# Patient Record
Sex: MALE | Race: BLACK OR AFRICAN AMERICAN | Employment: OTHER | URBAN - METROPOLITAN AREA
[De-identification: names, ages, dates, MRNs, and addresses within clinical notes are randomized per-mention and may not be internally consistent; named-entity substitution may affect disease eponyms.]

---

## 2019-03-07 ENCOUNTER — HOSPITAL ENCOUNTER (OUTPATIENT)
Dept: NON INVASIVE DIAGNOSTICS | Age: 69
Discharge: HOME | End: 2019-03-07
Attending: INTERNAL MEDICINE
Payer: MEDICARE

## 2019-03-07 VITALS
HEIGHT: 69 IN | WEIGHT: 315 LBS | DIASTOLIC BLOOD PRESSURE: 78 MMHG | BODY MASS INDEX: 46.65 KG/M2 | SYSTOLIC BLOOD PRESSURE: 157 MMHG

## 2019-03-07 DIAGNOSIS — R06.02 SOB (SHORTNESS OF BREATH): ICD-10-CM

## 2019-03-07 LAB
ECHO AO ASC DIAM: 3.93 CM
ECHO AO ROOT DIAM: 4.4 CM
ECHO EST RA PRESSURE: 3 MMHG
ECHO IVC SNIFF: 2.31 CM
ECHO LA AREA 4C: 20.7 CM2
ECHO LA MAJOR AXIS: 3.77 CM
ECHO LA TO AORTIC ROOT RATIO: 0.86
ECHO LA VOL 2C: 53.5 ML (ref 18–58)
ECHO LA VOL 4C: 69.3 ML (ref 18–58)
ECHO LA VOL BP: 62.2 ML (ref 18–58)
ECHO LA VOL/BSA BIPLANE: 24.53 ML/M2 (ref 16–28)
ECHO LA VOLUME INDEX A2C: 21.1 ML/M2 (ref 16–28)
ECHO LA VOLUME INDEX A4C: 27.33 ML/M2 (ref 16–28)
ECHO LV E' LATERAL VELOCITY: 8.67 CENTIMETER/SECOND
ECHO LV E' SEPTAL VELOCITY: 10.24 CENTIMETER/SECOND
ECHO LV INTERNAL DIMENSION DIASTOLIC: 5.74 CM (ref 4.2–5.9)
ECHO LV INTERNAL DIMENSION SYSTOLIC: 3.57 CM
ECHO LV IVSD: 1.42 CM (ref 0.6–1)
ECHO LV MASS 2D: 391.2 G (ref 88–224)
ECHO LV MASS INDEX 2D: 154.3 G/M2 (ref 49–115)
ECHO LV POSTERIOR WALL DIASTOLIC: 1.17 CM (ref 0.6–1)
ECHO LVOT DIAM: 2.52 CM
ECHO MV A VELOCITY: 87.81 CM/S
ECHO MV AREA PHT: 2.7 CM2
ECHO MV E DECELERATION TIME (DT): 282.9 MS
ECHO MV E VELOCITY: 69.91 CM/S
ECHO MV E/A RATIO: 0.8
ECHO MV PRESSURE HALF TIME (PHT): 82 MS
ECHO PULMONARY ARTERY SYSTOLIC PRESSURE (PASP): 17.6 MMHG
ECHO PV MAX VELOCITY: 104.9 CM/S
ECHO PV PEAK GRADIENT: 4.4 MMHG
ECHO RIGHT VENTRICULAR SYSTOLIC PRESSURE (RVSP): 17.6 MMHG
ECHO RV INTERNAL DIMENSION: 3.8 CM
ECHO RV TAPSE: 1.91 CM (ref 1.5–2)
ECHO TV REGURGITANT MAX VELOCITY: 190.99 CM/S
ECHO TV REGURGITANT PEAK GRADIENT: 14.6 MMHG

## 2019-03-07 PROCEDURE — 93306 TTE W/DOPPLER COMPLETE: CPT

## 2019-05-13 ENCOUNTER — APPOINTMENT (OUTPATIENT)
Dept: GENERAL RADIOLOGY | Age: 69
End: 2019-05-13
Attending: STUDENT IN AN ORGANIZED HEALTH CARE EDUCATION/TRAINING PROGRAM
Payer: MEDICARE

## 2019-05-13 ENCOUNTER — HOSPITAL ENCOUNTER (EMERGENCY)
Age: 69
Discharge: HOME | End: 2019-05-13
Attending: STUDENT IN AN ORGANIZED HEALTH CARE EDUCATION/TRAINING PROGRAM
Payer: MEDICARE

## 2019-05-13 VITALS
TEMPERATURE: 99.1 F | HEART RATE: 81 BPM | BODY MASS INDEX: 45.1 KG/M2 | HEIGHT: 70 IN | DIASTOLIC BLOOD PRESSURE: 95 MMHG | WEIGHT: 315 LBS | OXYGEN SATURATION: 94 % | SYSTOLIC BLOOD PRESSURE: 206 MMHG | RESPIRATION RATE: 20 BRPM

## 2019-05-13 DIAGNOSIS — R06.02 SOB (SHORTNESS OF BREATH): Primary | ICD-10-CM

## 2019-05-13 LAB
ALBUMIN SERPL-MCNC: 3.5 G/DL (ref 3.5–5)
ALBUMIN/GLOB SERPL: 0.8 {RATIO} (ref 1.1–2.2)
ALP SERPL-CCNC: 108 U/L (ref 45–117)
ALT SERPL-CCNC: 20 U/L (ref 12–78)
ANION GAP SERPL CALC-SCNC: 3 MMOL/L (ref 5–15)
AST SERPL-CCNC: 11 U/L (ref 15–37)
BASOPHILS # BLD: 0.1 K/UL (ref 0–0.1)
BASOPHILS NFR BLD: 1 % (ref 0–1)
BILIRUB SERPL-MCNC: 0.4 MG/DL (ref 0.2–1)
BNP SERPL-MCNC: 167 PG/ML
BUN SERPL-MCNC: 16 MG/DL (ref 6–20)
BUN/CREAT SERPL: 14 (ref 12–20)
CALCIUM SERPL-MCNC: 8.7 MG/DL (ref 8.5–10.1)
CHLORIDE SERPL-SCNC: 109 MMOL/L (ref 97–108)
CO2 SERPL-SCNC: 29 MMOL/L (ref 21–32)
COMMENT, HOLDF: NORMAL
COMMENT, HOLDF: NORMAL
CREAT SERPL-MCNC: 1.11 MG/DL (ref 0.7–1.3)
DIFFERENTIAL METHOD BLD: ABNORMAL
EOSINOPHIL # BLD: 0.3 K/UL (ref 0–0.4)
EOSINOPHIL NFR BLD: 3 % (ref 0–7)
ERYTHROCYTE [DISTWIDTH] IN BLOOD BY AUTOMATED COUNT: 16.9 % (ref 11.5–14.5)
GLOBULIN SER CALC-MCNC: 4.3 G/DL (ref 2–4)
GLUCOSE SERPL-MCNC: 179 MG/DL (ref 65–100)
HCT VFR BLD AUTO: 44.5 % (ref 36.6–50.3)
HGB BLD-MCNC: 14 G/DL (ref 12.1–17)
IMM GRANULOCYTES # BLD AUTO: 0 K/UL (ref 0–0.04)
IMM GRANULOCYTES NFR BLD AUTO: 0 % (ref 0–0.5)
LYMPHOCYTES # BLD: 2.2 K/UL (ref 0.8–3.5)
LYMPHOCYTES NFR BLD: 23 % (ref 12–49)
MCH RBC QN AUTO: 26.8 PG (ref 26–34)
MCHC RBC AUTO-ENTMCNC: 31.5 G/DL (ref 30–36.5)
MCV RBC AUTO: 85.2 FL (ref 80–99)
MONOCYTES # BLD: 0.7 K/UL (ref 0–1)
MONOCYTES NFR BLD: 8 % (ref 5–13)
NEUTS SEG # BLD: 6.4 K/UL (ref 1.8–8)
NEUTS SEG NFR BLD: 65 % (ref 32–75)
NRBC # BLD: 0 K/UL (ref 0–0.01)
NRBC BLD-RTO: 0 PER 100 WBC
PLATELET # BLD AUTO: 254 K/UL (ref 150–400)
PMV BLD AUTO: 10.1 FL (ref 8.9–12.9)
POTASSIUM SERPL-SCNC: 3.5 MMOL/L (ref 3.5–5.1)
PROT SERPL-MCNC: 7.8 G/DL (ref 6.4–8.2)
RBC # BLD AUTO: 5.22 M/UL (ref 4.1–5.7)
SAMPLES BEING HELD,HOLD: NORMAL
SAMPLES BEING HELD,HOLD: NORMAL
SODIUM SERPL-SCNC: 141 MMOL/L (ref 136–145)
TROPONIN I SERPL-MCNC: <0.05 NG/ML
WBC # BLD AUTO: 9.7 K/UL (ref 4.1–11.1)

## 2019-05-13 PROCEDURE — 83880 ASSAY OF NATRIURETIC PEPTIDE: CPT

## 2019-05-13 PROCEDURE — 85025 COMPLETE CBC W/AUTO DIFF WBC: CPT

## 2019-05-13 PROCEDURE — 80053 COMPREHEN METABOLIC PANEL: CPT

## 2019-05-13 PROCEDURE — 84484 ASSAY OF TROPONIN QUANT: CPT

## 2019-05-13 PROCEDURE — 93005 ELECTROCARDIOGRAM TRACING: CPT

## 2019-05-13 PROCEDURE — 36415 COLL VENOUS BLD VENIPUNCTURE: CPT

## 2019-05-13 PROCEDURE — 71046 X-RAY EXAM CHEST 2 VIEWS: CPT

## 2019-05-13 PROCEDURE — 99283 EMERGENCY DEPT VISIT LOW MDM: CPT

## 2019-05-13 NOTE — ED PROVIDER NOTES
Gordon Singh is a 78 y.o. male who presents to the ED with a c/o productive cough, Headache, Rt ear pain, shortness of breath onset 1 week ago. Pt currently rates his pain at 7/10 in severity. Pt denies any chest pain at this time. Pt reports chronic respiratory issues but states worsening sx over the last week. Of note, pt notes that he traveled to [facility] from April 15-20th. He is also followed by Dr. Regina Wakefield for Pulmonology. Pt specifically denies  shortness of breath, n/v,d , fever, chills, numbness, tingling, abdominal pain, back pain,  leg swelling, dizziness or any other acute sx. PCP: Rosemary Caceres MD 
PMHx significant for: HTN 
PSHx significant for: none Social Hx: Tobacco: none EtOH: none Illicit drug use: none There are no further complaints or symptoms at this time. Signed by: phi Espinal for [facility] Jeni. El Wilhelm MD on May 13th, 2019 at 7:00 PM. The history is provided by the patient. No  was used. No past medical history on file. No past surgical history on file. No family history on file. Social History Socioeconomic History  Marital status:  Spouse name: Not on file  Number of children: Not on file  Years of education: Not on file  Highest education level: Not on file Occupational History  Not on file Social Needs  Financial resource strain: Not on file  Food insecurity:  
  Worry: Not on file Inability: Not on file  Transportation needs:  
  Medical: Not on file Non-medical: Not on file Tobacco Use  Smoking status: Not on file Substance and Sexual Activity  Alcohol use: Not on file  Drug use: Not on file  Sexual activity: Not on file Lifestyle  Physical activity:  
  Days per week: Not on file Minutes per session: Not on file  Stress: Not on file Relationships  Social connections:  
  Talks on phone: Not on file Gets together: Not on file Attends Jain service: Not on file Active member of club or organization: Not on file Attends meetings of clubs or organizations: Not on file Relationship status: Not on file  Intimate partner violence:  
  Fear of current or ex partner: Not on file Emotionally abused: Not on file Physically abused: Not on file Forced sexual activity: Not on file Other Topics Concern  Not on file Social History Narrative  Not on file ALLERGIES: Patient has no allergy information on record. Review of Systems Constitutional: Negative for chills and fever. HENT: Positive for ear pain (rt). Respiratory: Positive for cough and shortness of breath. Cardiovascular: Negative for chest pain. Gastrointestinal: Negative for abdominal pain, diarrhea, nausea and vomiting. Musculoskeletal: Negative for back pain and neck pain. Neurological: Positive for headaches. Negative for weakness. All other systems reviewed and are negative. There were no vitals filed for this visit. Physical Exam  
Constitutional: He is oriented to person, place, and time. He appears well-nourished. No distress. morbidly obese HENT:  
Head: Normocephalic and atraumatic. Nose: Nose normal.  
Mouth/Throat: Oropharynx is clear and moist. No oropharyngeal exudate. Eyes: Conjunctivae and EOM are normal. Right eye exhibits no discharge. Left eye exhibits no discharge. No scleral icterus. Neck: Normal range of motion. Neck supple. No JVD present. No tracheal deviation present. No thyromegaly present. Cardiovascular: Normal rate, regular rhythm, normal heart sounds and intact distal pulses. Exam reveals no gallop and no friction rub. No murmur heard. Pulmonary/Chest: Effort normal and breath sounds normal. No stridor. No respiratory distress. He has no wheezes. He has no rales. He exhibits no tenderness.   
Dyspneic at rest  
 Abdominal: Bowel sounds are normal. He exhibits no distension and no mass. There is no tenderness. There is no rebound. Musculoskeletal: Normal range of motion. He exhibits no edema or tenderness. Lymphadenopathy:  
  He has no cervical adenopathy. Neurological: He is alert and oriented to person, place, and time. No cranial nerve deficit. Coordination normal.  
Skin: Skin is warm and dry. No rash noted. He is not diaphoretic. No erythema. No pallor. Psychiatric: He has a normal mood and affect. His behavior is normal. Judgment and thought content normal.  
Nursing note and vitals reviewed. MDM Number of Diagnoses or Management Options SOB (shortness of breath):  
  
Amount and/or Complexity of Data Reviewed Clinical lab tests: ordered and reviewed Tests in the radiology section of CPT®: reviewed and ordered Independent visualization of images, tracings, or specimens: yes Risk of Complications, Morbidity, and/or Mortality Presenting problems: moderate Diagnostic procedures: moderate Management options: moderate Patient Progress Patient progress: stable Procedures

## 2019-05-14 LAB
ATRIAL RATE: 74 BPM
CALCULATED P AXIS, ECG09: 45 DEGREES
CALCULATED R AXIS, ECG10: -36 DEGREES
CALCULATED T AXIS, ECG11: 6 DEGREES
DIAGNOSIS, 93000: NORMAL
P-R INTERVAL, ECG05: 216 MS
Q-T INTERVAL, ECG07: 420 MS
QRS DURATION, ECG06: 168 MS
QTC CALCULATION (BEZET), ECG08: 466 MS
VENTRICULAR RATE, ECG03: 74 BPM

## 2019-05-14 NOTE — DISCHARGE INSTRUCTIONS

## 2019-05-31 ENCOUNTER — TELEPHONE (OUTPATIENT)
Dept: INTERNAL MEDICINE | Age: 69
End: 2019-05-31

## 2019-07-22 ENCOUNTER — OFFICE VISIT (OUTPATIENT)
Dept: CARDIOLOGY CLINIC | Age: 69
End: 2019-07-22

## 2019-07-22 VITALS
WEIGHT: 315 LBS | BODY MASS INDEX: 45.1 KG/M2 | RESPIRATION RATE: 20 BRPM | HEART RATE: 77 BPM | OXYGEN SATURATION: 96 % | DIASTOLIC BLOOD PRESSURE: 80 MMHG | HEIGHT: 70 IN | SYSTOLIC BLOOD PRESSURE: 142 MMHG

## 2019-07-22 DIAGNOSIS — E11.9 TYPE 2 DIABETES MELLITUS WITHOUT COMPLICATION, WITH LONG-TERM CURRENT USE OF INSULIN (HCC): ICD-10-CM

## 2019-07-22 DIAGNOSIS — G47.33 OSA TREATED WITH BIPAP: ICD-10-CM

## 2019-07-22 DIAGNOSIS — Z79.4 TYPE 2 DIABETES MELLITUS WITHOUT COMPLICATION, WITH LONG-TERM CURRENT USE OF INSULIN (HCC): ICD-10-CM

## 2019-07-22 DIAGNOSIS — E66.01 MORBID (SEVERE) OBESITY DUE TO EXCESS CALORIES (HCC): ICD-10-CM

## 2019-07-22 DIAGNOSIS — I10 ESSENTIAL HYPERTENSION: ICD-10-CM

## 2019-07-22 DIAGNOSIS — R06.09 DOE (DYSPNEA ON EXERTION): Primary | ICD-10-CM

## 2019-07-22 DIAGNOSIS — I45.2 RBBB (RIGHT BUNDLE BRANCH BLOCK WITH LEFT ANTERIOR FASCICULAR BLOCK): ICD-10-CM

## 2019-07-22 DIAGNOSIS — J98.09 BRONCHOSPASTIC AIRWAY DISEASE: ICD-10-CM

## 2019-07-22 RX ORDER — LORATADINE 10 MG/1
10 TABLET ORAL
COMMUNITY

## 2019-07-22 RX ORDER — ACETAMINOPHEN 500 MG
TABLET ORAL
COMMUNITY

## 2019-07-22 RX ORDER — TRAMADOL HYDROCHLORIDE 50 MG/1
50 TABLET ORAL
COMMUNITY
End: 2020-02-17

## 2019-07-22 RX ORDER — MAGNESIUM SULFATE 100 %
15 CRYSTALS MISCELLANEOUS AS NEEDED
COMMUNITY

## 2019-07-22 RX ORDER — ASPIRIN 81 MG/1
TABLET ORAL DAILY
COMMUNITY

## 2019-07-22 RX ORDER — GABAPENTIN 100 MG/1
CAPSULE ORAL 2 TIMES DAILY
COMMUNITY

## 2019-07-22 RX ORDER — ALBUTEROL SULFATE 90 UG/1
AEROSOL, METERED RESPIRATORY (INHALATION)
COMMUNITY

## 2019-07-22 RX ORDER — SERTRALINE HYDROCHLORIDE 100 MG/1
TABLET, FILM COATED ORAL DAILY
COMMUNITY

## 2019-07-22 RX ORDER — LATANOPROST 50 UG/ML
1 SOLUTION/ DROPS OPHTHALMIC
COMMUNITY

## 2019-07-22 RX ORDER — GLUCOSAMINE SULFATE 1500 MG
POWDER IN PACKET (EA) ORAL DAILY
COMMUNITY

## 2019-07-22 RX ORDER — UMECLIDINIUM BROMIDE AND VILANTEROL TRIFENATATE 62.5; 25 UG/1; UG/1
POWDER RESPIRATORY (INHALATION)
Refills: 6 | COMMUNITY
Start: 2019-06-23

## 2019-07-22 RX ORDER — TAMSULOSIN HYDROCHLORIDE 0.4 MG/1
CAPSULE ORAL
Refills: 99 | COMMUNITY
Start: 2019-06-09

## 2019-07-22 RX ORDER — LOSARTAN POTASSIUM 100 MG/1
100 TABLET ORAL DAILY
COMMUNITY

## 2019-07-22 RX ORDER — ROSUVASTATIN CALCIUM 40 MG/1
40 TABLET, COATED ORAL
COMMUNITY

## 2019-07-22 RX ORDER — DILTIAZEM HYDROCHLORIDE 360 MG/1
360 CAPSULE, EXTENDED RELEASE ORAL DAILY
COMMUNITY

## 2019-07-22 RX ORDER — BISMUTH SUBSALICYLATE 262 MG
1 TABLET,CHEWABLE ORAL DAILY
COMMUNITY
End: 2020-02-17 | Stop reason: SDUPTHER

## 2019-07-22 RX ORDER — ALBUTEROL SULFATE 0.83 MG/ML
SOLUTION RESPIRATORY (INHALATION)
COMMUNITY

## 2019-07-22 RX ORDER — GUAIFENESIN 100 MG/5ML
200 SOLUTION ORAL
COMMUNITY
End: 2020-02-17

## 2019-07-22 RX ORDER — OXYBUTYNIN CHLORIDE 10 MG/1
TABLET, EXTENDED RELEASE ORAL
Refills: 99 | COMMUNITY
Start: 2019-06-09

## 2019-07-22 RX ORDER — LANOLIN ALCOHOL/MO/W.PET/CERES
CREAM (GRAM) TOPICAL
COMMUNITY

## 2019-07-22 NOTE — LETTER
7/23/19 Patient: Almita Tripp YOB: 1950 Date of Visit: 7/22/2019 Jossie Franklin MD 
[address] [address] VIA In Basket Dear Jossie Franklin MD, Thank you for referring Mr. Jason Teixeira to [address]  for evaluation. My notes for this consultation are attached. If you have questions, please do not hesitate to call me. I look forward to following your patient along with you. Sincerely, Mello Davies MD

## 2019-07-22 NOTE — PATIENT INSTRUCTIONS
-I highly recommend [facility] for weight loss.  -Check out Aliya Us with [employer]

## 2019-07-22 NOTE — PROGRESS NOTES
[address], [address]  [address]  [place], [address] [facility]    Office [phone]  Fax [phone]  Cell [phone]       Remigio Looney is a 78 y.o. male referred for evaluation of SOB. Consult requested by Corrina Schmitt MD      Diagnoses  Encounter Diagnoses     ICD-10-CM ICD-9-CM   1. HUITRON (dyspnea on exertion) R06.09 786.09   2. Bronchospastic airway disease J98.09 519.19   3. Type 2 diabetes mellitus without complication, with long-term current use of insulin (HCC) E11.9 250.00    Z79.4 V58.67   4. Essential hypertension I10 401.9   5. Morbid (severe) obesity due to excess calories (HCC) E66.01 278.01   6. JASWINDER treated with BiPAP G47.33 327.23   7. RBBB (right bundle branch block with left anterior fascicular block) I45.2 426.52       Assessment/Recommendations:    Chronic Class 3 HUITRON - multifactorial airway disease, obesity, sleep apnea. He may have HFpEF. Recent proBNP from the  (minimally elevated but could be falsely low due to morbid obesity) He has chronic edema with 2-3 pillow orthopnea. He is not on diuretic therapy at this time. Recent echo with mild LV dysfunction - EF 45-50%. Will evaluate for myocardial ischemia with lexiscan Cardiolite. If this is normal, consider maintenance diuretic therapy with Bumex 1mg/d. Morbid obesity, complicated by T1YK, HTN, JASWINDER. Will refer him for medical weight loss with Dr. Madelyn Stockton    Phone follow up after reviewing tests      Subjective:    No prior cardiac hx. He has HTN, DM, morbid obesity. Pt has chronic HUITRON with small airway disease based on PFTs, followed by Corrina Schmitt MD. Recent echo demonstrated mild EF dysfunction 45-50%. Adenosine nuclear 2010 apical ischemia EF 51%. He has hx of cath at [facility] years ago, apparently normal.     Remigio Looney reports he gets winded very easily with minimal exertion, but no tightness in his chest. He has a mild cough productive of clear phlegm.  He wears continuous O2. He presented to the ED in 5/2019 for SOB. CXR with atx, low grade proBNP elevation. EKG with RBBB    He leads a sedentary lifestyle. He is retired from [employer] and he gets a lot of his care at the [place]. He is also a former . He has JASWINDER on BiPAP. He ambulates with a cane. Patient denies any exertional chest pain, palpitations, syncope,  or paroxysmal nocturnal dyspnea. He is here today with his wife. Cardiac risk factors   HTN yes  DM  yes  Smoking no, former pipe smoker  Family hx of CAD no      Cardiac testing  Echo 3/7/19 - EF 45-50%, mild LVH    Patient Active Problem List    Diagnosis    RBBB (right bundle branch block with left anterior fascicular block)    JASWINDER treated with BiPAP    Essential hypertension    Type 2 diabetes mellitus without complication, with long-term current use of insulin ([facility])    Bronchospastic airway disease    Morbid (severe) obesity due to excess calories ([address]       History reviewed. No pertinent past medical history. Social History     Socioeconomic History    Marital status:      Spouse name: Not on file    Number of children: Not on file    Years of education: Not on file    Highest education level: Not on file   Tobacco Use    Smoking status: Former Smoker    Smokeless tobacco: Never Used   Substance and Sexual Activity    Alcohol use: Yes     Comment: occasionally           No Known Allergies       Review of Symptoms:  Constitutional: Negative for fever, chills, malaise/fatigue and diaphoresis. Respiratory: Negative for hemoptysis, sputum production, and wheezing. +HUITRON, cough, pillow orthopnea  Cardiovascular: Negative for chest pain, palpitations, orthopnea, claudication, leg swelling and PND. Gastrointestinal: Negative for heartburn, nausea, vomiting, blood in stool and melena. Genitourinary: Negative for dysuria and flank pain. Musculoskeletal: Negative for joint pain and back pain.   Skin: Negative for rash.  Neurological: Negative for focal weakness, seizures, loss of consciousness, weakness and headaches. Endo/Heme/Allergies: Does not bruise/bleed easily. Psychiatric/Behavioral: Negative for memory loss. The patient does not have insomnia. Physical Exam  Visit Vitals  /80 (BP 1 Location: Left arm, BP Patient Position: Sitting)   Pulse 77   Resp 20   Ht 5' 10\" (1.778 m)   Wt 332 lb (150.6 kg)   SpO2 96%   BMI 47.64 kg/m²     Wt Readings from Last 3 Encounters:   07/22/19 332 lb (150.6 kg)   05/13/19 334 lb (151.5 kg)   03/07/19 324 lb (147 kg)     General - morbidly obese BM  HEENT: Eyes without jaundice, Hearing intact  Neck - JVP difficult to assess due to thick neck, thyroid nl  Cardiac - normal S1,S2, no murmurs, rubs or gallops. No clicks  Vascular - carotids without bruits, radials, femorals and pedal pulses equal bilateral  Lungs - clear to auscultation bilaterals, no rales ,wheezing or rhonchi  Abd - soft nontender, no HSM, no abd bruits  Extremities - 2+ pretibial edema bilaterally  Neuro - nonfocal, normal gait  Psych - mood and affect normal    Labs:      Cardiographics  Echo 3/7/19 - EF 45-50%, mild LVH  EKG 5/13/19 - SR, RBBB, LAHB, no significant change from 7/16/10          Written by Krysta Palafox, as dictated by Alva Escobedo M.D.      Alva Escobedo MD

## 2019-07-22 NOTE — PROGRESS NOTES
Remigio Looney is a 78 y.o. male    Chief Complaint   Patient presents with    New Patient    Shortness of Breath       Visit Vitals  /80 (BP 1 Location: Left arm, BP Patient Position: Sitting)   Pulse 77   Resp 20   Ht 5' 10\" (1.778 m)   Wt 332 lb (150.6 kg)   SpO2 96%   BMI 47.64 kg/m²       1. Have you been to the ER, urgent care clinic since your last visit? Hospitalized since your last visit? YES, 5/13/19 for SOB and cough. 2. Have you seen or consulted any other health care providers outside of the [address] [place] [address] since your last visit? Include any pap smears or colon screening.  NO

## 2019-07-23 PROBLEM — E11.9 TYPE 2 DIABETES MELLITUS WITHOUT COMPLICATION, WITH LONG-TERM CURRENT USE OF INSULIN (HCC): Status: ACTIVE | Noted: 2019-07-23

## 2019-07-23 PROBLEM — J98.09 BRONCHOSPASTIC AIRWAY DISEASE: Status: ACTIVE | Noted: 2019-07-23

## 2019-07-23 PROBLEM — G47.33 OSA TREATED WITH BIPAP: Status: ACTIVE | Noted: 2019-07-23

## 2019-07-23 PROBLEM — Z79.4 TYPE 2 DIABETES MELLITUS WITHOUT COMPLICATION, WITH LONG-TERM CURRENT USE OF INSULIN (HCC): Status: ACTIVE | Noted: 2019-07-23

## 2019-07-23 PROBLEM — I45.2 RBBB (RIGHT BUNDLE BRANCH BLOCK WITH LEFT ANTERIOR FASCICULAR BLOCK): Status: ACTIVE | Noted: 2019-07-23

## 2019-07-23 PROBLEM — I10 ESSENTIAL HYPERTENSION: Status: ACTIVE | Noted: 2019-07-23

## 2019-08-21 ENCOUNTER — OFFICE VISIT (OUTPATIENT)
Dept: FAMILY MEDICINE CLINIC | Age: 69
End: 2019-08-21

## 2019-08-21 VITALS
RESPIRATION RATE: 22 BRPM | DIASTOLIC BLOOD PRESSURE: 72 MMHG | SYSTOLIC BLOOD PRESSURE: 144 MMHG | HEART RATE: 75 BPM | WEIGHT: 315 LBS | HEIGHT: 70 IN | OXYGEN SATURATION: 90 % | BODY MASS INDEX: 45.1 KG/M2 | TEMPERATURE: 98 F

## 2019-08-21 DIAGNOSIS — I10 ESSENTIAL HYPERTENSION: ICD-10-CM

## 2019-08-21 DIAGNOSIS — J98.09 BRONCHOSPASTIC AIRWAY DISEASE: ICD-10-CM

## 2019-08-21 DIAGNOSIS — E11.9 TYPE 2 DIABETES MELLITUS WITHOUT COMPLICATION, WITH LONG-TERM CURRENT USE OF INSULIN (HCC): Primary | ICD-10-CM

## 2019-08-21 DIAGNOSIS — G47.33 OSA TREATED WITH BIPAP: ICD-10-CM

## 2019-08-21 DIAGNOSIS — Z79.4 TYPE 2 DIABETES MELLITUS WITHOUT COMPLICATION, WITH LONG-TERM CURRENT USE OF INSULIN (HCC): Primary | ICD-10-CM

## 2019-08-21 DIAGNOSIS — E66.01 MORBID (SEVERE) OBESITY DUE TO EXCESS CALORIES (HCC): ICD-10-CM

## 2019-08-21 LAB — HBA1C MFR BLD HPLC: 6.9 %

## 2019-08-21 NOTE — PROGRESS NOTES
1. Have you been to the ER, urgent care clinic since your last visit? Hospitalized since your last visit? No    2. Have you seen or consulted any other health care providers outside of the [address] [place] [address] since your last visit? Include any pap smears or colon screening.  [facility]    Chief Complaint   Patient presents with   [facility]

## 2019-08-21 NOTE — PROGRESS NOTES
Weight Loss Progress Note: Initial Physician Visit      Cam Moody is a 78 y.o. male with BMI   47.64 who is here for his Initial Evaluation for the medical bariatric care. CC: my cardiologist said I need to lose weight  He was referred by cardiology, Dr Miguel Rizo. He is here to       65 units in am and 50 in pm of nph insulin  He also takes victoza        Weight History  Current weight 332 and BMI is Body mass index is 47.64 kg/m². Goal weight BMI 30  Highest weight 332  (See weight gain time line scanned into media section of chart)    Weight loss History  How many weight loss attempts have you had? *none  Which program were you most successful doing?  na    Significant Medical History    Have you ever taken appetite suppressants? no   If yes: Rx or OTC? If yes; Any negative side effects? Ever diagnosed with sleep apnea or put on CPAP no    Ever had bariatric surgery: no    Pregnant or planning on becoming pregnant w/in 6 months: no        Significant Psychosocial History   Any history of drug abuse or dependence: no  Current Major Lifestyle Changes: no  Any potential unsupportive people: no  Why are you starting a weight loss program now? Heart doc said he needs to  Are you ready? yes    History of binge eating disorder or anorexia : no   If yes, are you currently being treated no    Social History  Social History     Tobacco Use    Smoking status: Former Smoker    Smokeless tobacco: Never Used   Substance Use Topics    Alcohol use: Yes     Comment: occasionally     How many times a week do you eat out? 6    Do you drink any EtOH?  no   If so, how much? Do you have upcoming any travel in the next 6 weeks?  no   If so, what do you have planned?           Exercise  How many days a week do you currently exercise?  0 days  Have you ever been told by a physician not to exercise?  no      Objective  Visit Vitals  /72   Pulse 75   Temp 98 °F (36.7 °C) (Oral)   Resp 22   Ht 5' 10\" (1.778 m)   Wt 332 lb (150.6 kg)   SpO2 90% Comment: pt is prescribe O2 at 2L and currently not in place   BMI 47.64 kg/m²       Waist Circumference: See Weight Management Doc Flowsheet  Neck Circumference: See Weight Management Doc Flowsheet  Percent Body Fat: See Weight Management Doc Flowsheet  Weight Metrics 8/21/2019 8/21/2019 7/22/2019 5/13/2019 3/7/2019   Weight - 332 lb 332 lb 334 lb 324 lb   Neck Circ (inches) 19 - - - -   Waist Measure Inches 58.5 - - - -   BMI - 47.64 kg/m2 47.64 kg/m2 47.92 kg/m2 47.85 kg/m2         Labs:     Review of Systems  Complete ROS negative except where noted above      Physical Exam    Vital Signs Reviewed  Weight Management Metrics Reviewed    Vital Signs Reviewed  Appearance: Obese, A&O x 3, NAD  HEENT:  NC/AT, EOMI, PERRL, No scleral icterus, malampatti score:  Skin:    Skin tags - no   Acanthosis Nigricans - no  Neck:  No nodes, thyromegaly no  Heart:  RRR without M/R/G  Lungs:  CTAB, no rhonchi, rales, or wheezes with good air exchange   Abdomen:  Non-tender, pos bowel sounds; hepatomegaly - no  Ext:  No C/C/E        Assessment & Plan  Diagnoses and all orders for this visit:    1. Type 2 diabetes mellitus without complication, with long-term current use of insulin (HCC)  -     AMB POC HEMOGLOBIN A1C    2. Morbid (severe) obesity due to excess calories ([address]  Diet:  He wants to attend orienttion for the [employer]    Activity start moving more each day    medication  3. Essential hypertension  bp borderline  4. JASWINDER treated with BiPAP  Not using the cpap  5. Bronchospastic airway disease        Based on his history and exam, Susy García is a good candidate for the Non-[facility] Weight Loss Program     Diet Plan: Activity Plan:    Medication Plan      Patient Instructions   1. [facility] changing the zoloft to wellbutrin        Follow-up and Dispositions    · Return for weight management [facility].          Over 50% of the 30 minutes face to face time with Celsa Davenport consisted of counseling & coordinating and/or discussing treatment plans in reference to his obesity The primary encounter diagnosis was Type 2 diabetes mellitus without complication, with long-term current use of insulin ([facility] Diagnoses of Morbid (severe) obesity due to excess calories ([address] Essential hypertension, JASWINDER treated with BiPAP, and Bronchospastic airway disease were also pertinent to this visit.

## 2019-09-09 ENCOUNTER — PATIENT MESSAGE (OUTPATIENT)
Dept: CARDIOLOGY CLINIC | Age: 69
End: 2019-09-09

## 2019-09-10 NOTE — TELEPHONE ENCOUNTER
Cardiac testing  Echo 3/7/19 - EF 45-50%, mild LVH    Lexiscan Cardiolite 9/6/19 - equivocal perfusion study. Fixed inferior defect likely due to attenuation artifact but infarction cannot be excluded. I have contacted Mr. Viviana Molina and discussed equivoal stress test results and recommendations for further evaluation with cardiac cathterization with Dr. Claudia Espinal. He is agreeable to proceed at this time. Lab Results   Component Value Date/Time    Sodium 141 05/13/2019 07:14 PM    Potassium 3.5 05/13/2019 07:14 PM    Chloride 109 (H) 05/13/2019 07:14 PM    CO2 29 05/13/2019 07:14 PM    Anion gap 3 (L) 05/13/2019 07:14 PM    Glucose 179 (H) 05/13/2019 07:14 PM    BUN 16 05/13/2019 07:14 PM    Creatinine 1.11 05/13/2019 07:14 PM    BUN/Creatinine ratio 14 05/13/2019 07:14 PM    GFR est AA >60 05/13/2019 07:14 PM    GFR est non-AA >60 05/13/2019 07:14 PM    Calcium 8.7 05/13/2019 07:14 PM     Lab Results   Component Value Date/Time    WBC 9.7 05/13/2019 07:14 PM    Hemoglobin (POC) 14.3 07/14/2010 01:32 PM    HGB 14.0 05/13/2019 07:14 PM    Hematocrit (POC) 42 07/14/2010 01:32 PM    HCT 44.5 05/13/2019 07:14 PM    PLATELET 340 55/48/3382 07:14 PM    MCV 85.2 05/13/2019 07:14 PM     Need to repeat BMP and CBC pre-procedure. Will forward on for scheduling.

## 2019-09-12 ENCOUNTER — TELEPHONE (OUTPATIENT)
Dept: CARDIOLOGY CLINIC | Age: 69
End: 2019-09-12

## 2019-09-12 DIAGNOSIS — R06.02 SOB (SHORTNESS OF BREATH): ICD-10-CM

## 2019-09-12 DIAGNOSIS — I10 ESSENTIAL HYPERTENSION: ICD-10-CM

## 2019-09-12 DIAGNOSIS — R94.39 ABNORMAL STRESS TEST: Primary | ICD-10-CM

## 2019-09-24 DIAGNOSIS — I10 ESSENTIAL HYPERTENSION: ICD-10-CM

## 2019-09-24 DIAGNOSIS — R94.39 ABNORMAL STRESS TEST: Primary | ICD-10-CM

## 2019-09-24 RX ORDER — SODIUM CHLORIDE 0.9 % (FLUSH) 0.9 %
5-40 SYRINGE (ML) INJECTION EVERY 8 HOURS
Status: CANCELLED | OUTPATIENT
Start: 2019-10-08

## 2019-09-24 RX ORDER — SODIUM CHLORIDE 9 MG/ML
50 INJECTION, SOLUTION INTRAVENOUS CONTINUOUS
Status: CANCELLED | OUTPATIENT
Start: 2019-10-08

## 2019-09-24 RX ORDER — DIPHENHYDRAMINE HYDROCHLORIDE 50 MG/ML
25 INJECTION, SOLUTION INTRAMUSCULAR; INTRAVENOUS
Status: CANCELLED | OUTPATIENT
Start: 2019-10-08 | End: 2019-10-08

## 2019-09-24 RX ORDER — SODIUM CHLORIDE 0.9 % (FLUSH) 0.9 %
5-40 SYRINGE (ML) INJECTION AS NEEDED
Status: CANCELLED | OUTPATIENT
Start: 2019-10-08

## 2019-09-24 NOTE — PROGRESS NOTES
Spoke with pt concerning cath procedure. (Pt IDx2). Instructions given to pt per Andreia. Pt NPO after midnight; hold insulin and take all other meds with sip of water in AM; have someone available to drive pt to and from procedure; pack a bag in case an overnight stay is warranted. cath scheduled for 0845 on 10/8/19. Pt advised to arrive 2hrs prior to procedure for prep. Labs cbc,cmp. Pt expressed understanding. Opportunities for questions, clarifications, and concerns provided.

## 2019-10-02 LAB
BUN SERPL-MCNC: 13 MG/DL (ref 8–27)
BUN/CREAT SERPL: 14 (ref 10–24)
CALCIUM SERPL-MCNC: 9.3 MG/DL (ref 8.6–10.2)
CHLORIDE SERPL-SCNC: 99 MMOL/L (ref 96–106)
CO2 SERPL-SCNC: 28 MMOL/L (ref 20–29)
CREAT SERPL-MCNC: 0.93 MG/DL (ref 0.76–1.27)
ERYTHROCYTE [DISTWIDTH] IN BLOOD BY AUTOMATED COUNT: 15.8 % (ref 12.3–15.4)
GLUCOSE SERPL-MCNC: 87 MG/DL (ref 65–99)
HCT VFR BLD AUTO: 41.7 % (ref 37.5–51)
HGB BLD-MCNC: 13.6 G/DL (ref 13–17.7)
MCH RBC QN AUTO: 26.4 PG (ref 26.6–33)
MCHC RBC AUTO-ENTMCNC: 32.6 G/DL (ref 31.5–35.7)
MCV RBC AUTO: 81 FL (ref 79–97)
PLATELET # BLD AUTO: 287 X10E3/UL (ref 150–450)
POTASSIUM SERPL-SCNC: 3.7 MMOL/L (ref 3.5–5.2)
RBC # BLD AUTO: 5.15 X10E6/UL (ref 4.14–5.8)
SODIUM SERPL-SCNC: 144 MMOL/L (ref 134–144)
WBC # BLD AUTO: 9.5 X10E3/UL (ref 3.4–10.8)

## 2019-10-08 ENCOUNTER — HOSPITAL ENCOUNTER (OUTPATIENT)
Age: 69
Setting detail: OUTPATIENT SURGERY
Discharge: HOME | End: 2019-10-08
Attending: SPECIALIST | Admitting: SPECIALIST
Payer: MEDICARE

## 2019-10-08 VITALS
HEART RATE: 77 BPM | OXYGEN SATURATION: 93 % | SYSTOLIC BLOOD PRESSURE: 136 MMHG | RESPIRATION RATE: 20 BRPM | HEIGHT: 70 IN | TEMPERATURE: 97.7 F | WEIGHT: 315 LBS | BODY MASS INDEX: 45.1 KG/M2 | DIASTOLIC BLOOD PRESSURE: 68 MMHG

## 2019-10-08 DIAGNOSIS — I10 ESSENTIAL HYPERTENSION: ICD-10-CM

## 2019-10-08 DIAGNOSIS — R94.39 ABNORMAL STRESS TEST: ICD-10-CM

## 2019-10-08 LAB
GLUCOSE BLD STRIP.AUTO-MCNC: 68 MG/DL (ref 65–100)
GLUCOSE BLD STRIP.AUTO-MCNC: 73 MG/DL (ref 65–100)
GLUCOSE BLD STRIP.AUTO-MCNC: 75 MG/DL (ref 65–100)
GLUCOSE BLD STRIP.AUTO-MCNC: 83 MG/DL (ref 65–100)
GLUCOSE BLD STRIP.AUTO-MCNC: 99 MG/DL (ref 65–100)
SERVICE CMNT-IMP: NORMAL

## 2019-10-08 PROCEDURE — 74011000250 HC RX REV CODE- 250: Performed by: SPECIALIST

## 2019-10-08 PROCEDURE — C1894 INTRO/SHEATH, NON-LASER: HCPCS | Performed by: SPECIALIST

## 2019-10-08 PROCEDURE — 77030019569 HC BND COMPR RAD TERU -B: Performed by: SPECIALIST

## 2019-10-08 PROCEDURE — 74011250636 HC RX REV CODE- 250/636: Performed by: SPECIALIST

## 2019-10-08 PROCEDURE — 82962 GLUCOSE BLOOD TEST: CPT

## 2019-10-08 PROCEDURE — 93458 L HRT ARTERY/VENTRICLE ANGIO: CPT | Performed by: SPECIALIST

## 2019-10-08 PROCEDURE — C1887 CATHETER, GUIDING: HCPCS | Performed by: SPECIALIST

## 2019-10-08 PROCEDURE — 94660 CPAP INITIATION&MGMT: CPT

## 2019-10-08 PROCEDURE — 77030029065 HC DRSG HEMO QCLOT ZMED -B

## 2019-10-08 PROCEDURE — 99152 MOD SED SAME PHYS/QHP 5/>YRS: CPT | Performed by: SPECIALIST

## 2019-10-08 PROCEDURE — 77030008543 HC TBNG MON PRSS MRTM -A: Performed by: SPECIALIST

## 2019-10-08 PROCEDURE — 74011636320 HC RX REV CODE- 636/320: Performed by: SPECIALIST

## 2019-10-08 PROCEDURE — C1769 GUIDE WIRE: HCPCS | Performed by: SPECIALIST

## 2019-10-08 PROCEDURE — 74011000258 HC RX REV CODE- 258

## 2019-10-08 PROCEDURE — 77030004532 HC CATH ANGI DX IMP BSC -A: Performed by: SPECIALIST

## 2019-10-08 PROCEDURE — 99153 MOD SED SAME PHYS/QHP EA: CPT | Performed by: SPECIALIST

## 2019-10-08 PROCEDURE — 74011250637 HC RX REV CODE- 250/637

## 2019-10-08 RX ORDER — SODIUM CHLORIDE 0.9 % (FLUSH) 0.9 %
5-40 SYRINGE (ML) INJECTION EVERY 8 HOURS
Status: DISCONTINUED | OUTPATIENT
Start: 2019-10-08 | End: 2019-10-08 | Stop reason: HOSPADM

## 2019-10-08 RX ORDER — MIDAZOLAM HYDROCHLORIDE 1 MG/ML
INJECTION, SOLUTION INTRAMUSCULAR; INTRAVENOUS AS NEEDED
Status: DISCONTINUED | OUTPATIENT
Start: 2019-10-08 | End: 2019-10-08 | Stop reason: HOSPADM

## 2019-10-08 RX ORDER — SODIUM CHLORIDE 0.9 % (FLUSH) 0.9 %
5-40 SYRINGE (ML) INJECTION AS NEEDED
Status: DISCONTINUED | OUTPATIENT
Start: 2019-10-08 | End: 2019-10-08 | Stop reason: HOSPADM

## 2019-10-08 RX ORDER — DEXTROSE MONOHYDRATE 100 MG/ML
INJECTION, SOLUTION INTRAVENOUS
Status: COMPLETED
Start: 2019-10-08 | End: 2019-10-08

## 2019-10-08 RX ORDER — BUMETANIDE 1 MG/1
1 TABLET ORAL DAILY
Qty: 30 TAB | Refills: 1 | Status: SHIPPED | OUTPATIENT
Start: 2019-10-08 | End: 2019-11-03 | Stop reason: SDUPTHER

## 2019-10-08 RX ORDER — SODIUM CHLORIDE 9 MG/ML
150 INJECTION, SOLUTION INTRAVENOUS CONTINUOUS
Status: DISPENSED | OUTPATIENT
Start: 2019-10-08 | End: 2019-10-08

## 2019-10-08 RX ORDER — LIDOCAINE HYDROCHLORIDE 10 MG/ML
INJECTION INFILTRATION; PERINEURAL AS NEEDED
Status: DISCONTINUED | OUTPATIENT
Start: 2019-10-08 | End: 2019-10-08 | Stop reason: HOSPADM

## 2019-10-08 RX ORDER — DIPHENHYDRAMINE HYDROCHLORIDE 50 MG/ML
25 INJECTION, SOLUTION INTRAMUSCULAR; INTRAVENOUS
Status: COMPLETED | OUTPATIENT
Start: 2019-10-08 | End: 2019-10-08

## 2019-10-08 RX ORDER — SODIUM CHLORIDE 9 MG/ML
50 INJECTION, SOLUTION INTRAVENOUS CONTINUOUS
Status: DISCONTINUED | OUTPATIENT
Start: 2019-10-08 | End: 2019-10-08 | Stop reason: HOSPADM

## 2019-10-08 RX ORDER — HEPARIN SODIUM 1000 [USP'U]/ML
INJECTION, SOLUTION INTRAVENOUS; SUBCUTANEOUS AS NEEDED
Status: DISCONTINUED | OUTPATIENT
Start: 2019-10-08 | End: 2019-10-08 | Stop reason: HOSPADM

## 2019-10-08 RX ORDER — MAGNESIUM SULFATE 100 %
16 CRYSTALS MISCELLANEOUS AS NEEDED
Status: DISCONTINUED | OUTPATIENT
Start: 2019-10-08 | End: 2019-10-08 | Stop reason: HOSPADM

## 2019-10-08 RX ORDER — HEPARIN SODIUM 200 [USP'U]/100ML
INJECTION, SOLUTION INTRAVENOUS
Status: COMPLETED | OUTPATIENT
Start: 2019-10-08 | End: 2019-10-08

## 2019-10-08 RX ORDER — FENTANYL CITRATE 50 UG/ML
INJECTION, SOLUTION INTRAMUSCULAR; INTRAVENOUS AS NEEDED
Status: DISCONTINUED | OUTPATIENT
Start: 2019-10-08 | End: 2019-10-08 | Stop reason: HOSPADM

## 2019-10-08 RX ORDER — MAGNESIUM SULFATE 100 %
CRYSTALS MISCELLANEOUS
Status: COMPLETED
Start: 2019-10-08 | End: 2019-10-08

## 2019-10-08 RX ORDER — VERAPAMIL HYDROCHLORIDE 2.5 MG/ML
INJECTION, SOLUTION INTRAVENOUS AS NEEDED
Status: DISCONTINUED | OUTPATIENT
Start: 2019-10-08 | End: 2019-10-08 | Stop reason: HOSPADM

## 2019-10-08 RX ADMIN — DIPHENHYDRAMINE HYDROCHLORIDE 25 MG: 50 INJECTION, SOLUTION INTRAMUSCULAR; INTRAVENOUS at 08:14

## 2019-10-08 RX ADMIN — DEXTROSE MONOHYDRATE 125 ML: 10 INJECTION, SOLUTION INTRAVENOUS at 07:47

## 2019-10-08 RX ADMIN — Medication 16 G: at 10:36

## 2019-10-08 RX ADMIN — SODIUM CHLORIDE 50 ML/HR: 900 INJECTION, SOLUTION INTRAVENOUS at 08:17

## 2019-10-08 NOTE — PROCEDURES
Cardiac Catheterization    Date of Procedure: 10/8/2019   Preoperative Diagnosis: HUITRON, abnormal stress MPI, cardiomyopathy  Postoperative Diagnosis: see below    Procedure: Left heart cath, coronary angiography via right radial artery  Cardiologist: Lance Franco MD  Anesthesia: local + IV sedation  Estimated Blood Loss: Minimal  Specimens removed: None  Findings: EDP 14, no AV gradient, mild LCA Ca2+, LM nl, LAD mild plaque, LCX nl, RCA dom, nl.  See full cath note.   Complications: none    Mild CAD  Mildly elevated LVEDP  Mild LV dysfunction by echo    Chronic HUITRON, multifactorial etiology - morbid obesity, JASWINDER, HFpEF  Continue weight loss program

## 2019-10-08 NOTE — PROGRESS NOTES
7:11 AM    Patient arrived. ID and allergies verified verbally with patient. Pt voices understanding of procedure to be performed. Consent obtained. Pt prepped for procedure. [address] [facility],[address]    Patient with hypoglycemic episode(s) at 0755(time) on 10/8/19(date). BG value(s) pre-treatment 76    Was patient symptomatic? [] yes, [x] no  Patient was treated with the following rescue medications/treatments: [] D50                [] Glucose tablets                [] Glucagon                [] 4oz juice                [] 6oz reg soda                [] 8oz low fat milk  BG value post-treatment: 99  Once BG treated and value greater than 80mg/dl, pt was provided with the following:  [] snack  [] meal  Name of MD notified:yes - Lyn Gonzales    D10 used - protocol followed for D50 shortage      TRANSFER - OUT REPORT:    Verbal report given to Edd Saint, RN on [facility]  being transferred to [address] [place] [address] for ordered procedure       Report consisted of patients Situation, Background, Assessment and   Recommendations(SBAR). Information from the following report(s) SBAR was reviewed with the receiving nurse. Lines:   Peripheral IV 10/08/19 Left Antecubital (Active)        Opportunity for questions and clarification was provided. Patient transported with:   O2 @ CPAP liters  Registered Nurse        10:14 AM    TRANSFER - IN REPORT:    Verbal report received from [address] [place] [address],[address], RN (name) on [facility]  being received from [address] [place] [address] (unit) for routine progression of care      Report consisted of patients Situation, Background, Assessment and   Recommendations(SBAR). Information from the following report(s) Procedure Summary was reviewed with the receiving nurse. Opportunity for questions and clarification was provided. Assessment completed upon patients arrival to unit and care assumed.            HYPOGLYCEMIC EPISODE DOCUMENTATION    Patient with hypoglycemic episode(s) at 1012(time) on 10/8/19. BG value(s) pre-treatment 76    Was patient symptomatic? [] yes, [x] no  Patient was treated with the following rescue medications/treatments: [] D50                [] Glucose tablets                [] Glucagon                [x] 4oz juice                [] 6oz reg soda                [] 8oz low fat milk  BG value post-treatment: 73  Once BG treated and value greater than 80mg/dl, pt was provided with the following:  [x] snack  [] meal     HYPOGLYCEMIC EPISODE DOCUMENTATION    Patient with hypoglycemic episode(s) at 1030 on 10/8/19    BG value(s) [id number]   Was patient symptomatic? [] yes, [x] no  Patient was treated with the following rescue medications/treatments: [] D50                [x] Glucose tablets                [] Glucagon                [] 4oz juice                [] 6oz reg soda                [] 8oz low fat milk  BG value post-treatment: 83Once BG treated and value greater than 80mg/dl, pt was provided with the following:  [x] snack  [] meal  Name of MD notified:abigail  The following orders were received: none        11:04 AM    Patient sitting up in bed eating snacks      1135    2 ml air released from TR Band. No bleeding or hematoma noted. Radial and Ulnar pulse on RIGHT wrist palpable. Pt tolerated well. Will continue to monitor. 11:40 AM    2 ml air released from TR Band. No bleeding or hematoma noted. Radial and Ulnar pulse on RIGHT wrist palpable. Pt tolerated well. Will continue to monitor. 11:45 AM    2 ml air released from TR Band. No bleeding or hematoma noted. Radial and Ulnar pulse on RIGHT wrist palpable. Pt tolerated well. Will continue to monitor. 11:50 AM    3 ml air released from TR Band. No bleeding or hematoma noted. Radial and Ulnar pulse on RIGHT wrist palpable. Pt tolerated well. Will continue to monitor. 1155    3 ml air released from TR Band. No bleeding or hematoma noted. Radial and Ulnar pulse on RIGHT wrist palpable.  Pt tolerated well. Will continue to monitor. 1200    Air release completed. TR Band removed from RIGHT wrist. No bleeding or  Hematoma. Dressing applied. Wrist immobilizer in place. Radial and ulnar pulse remain palpable on affected extremity. Pt tolerated well. Instructions given to pt regarding movement and activity restrictions. Pt voiced understanding. 1220    Discharge instructions reviewed with patient and family. Voiced understanding. Patient given copy of discharge instructions to take home. [facility] MEDICATION AND SIDE EFFECT GUIDE    The [facility] MEDICATION AND SIDE EFFECT GUIDE was provided to the PATIENT AND CARE PROVIDER. Information provided includes instruction about drug purpose and common side effects for the following medications:    · BUMEX    1230    Pt discharged via wheelchair with [place]. Personal belongings with patient upon discharge.

## 2019-10-08 NOTE — Clinical Note
TRANSFER - OUT REPORT:  
 
Verbal report given to: Spoke with Enedina Remy. Bedside report to be given. Report consisted of patient's Situation, Background, Assessment and  
Recommendations(SBAR). Opportunity for questions and clarification was provided. Patient transported with a Registered Nurse and [address] [place] [address] / Patient Care Tech. Patient transported to: [facility].

## 2019-10-08 NOTE — DISCHARGE INSTRUCTIONS
Patient Discharge Instructions    [facility] / [id number] : 1950    Admitted 10/8/2019 Discharged: 10/8/2019     Take Home Medications     Resume home medications  Talk to diabetes doctor about starting you on Jardiance for your heart and diabetes  Start bumetanide 1 mg daily to remove fluid  followup with Dr Aria Toth to start your weight loss journey - VERY IMPORTANT!!!       · It is important that you take the medication exactly as they are prescribed. · Keep your medication in the bottles provided by the pharmacist and keep a list of the medication names, dosages, and times to be taken in your wallet. · Do not take other medications without consulting your doctor. Transradial Cardiac Catheterization/Angiography Discharge Instructions    It is normal to feel tired the first couple days. Take it easy and follow the physicians instructions. CHECK THE CATHETER INSERTION SITE DAILY:    Remove the wrist dressing 24 hours after the procedure. You may shower 24 hours after the procedure. Wash with soap and water and pat dry. Gentle cleaning of the site with soap and water is sufficient, cover with a dry clean dressing or bandage. Do not apply creams or powders to the area. No soaking the wrist for 3 days. Leave the puncture site open to air after 24 hours post-procedure. CALL THE PHYSICIANS:     If the site becomes red, swollen or feels warm to the touch  If there is bleeding or drainage or if there is unusual pain at the radial site. If there is any minor oozing, you may apply a band-aid and remove after 12 hours. If the bleeding continues, hold pressure with the middle finger against the puncture site and the thumb against the back of the wrist,call 911 to be transported to the hospital.  DO NOT DRIVE YOURSELF, [place] [address].     ACTIVITY:   For the first 24 hours do not manipulate the wrist.  No lifting, pushing or pulling over 3-5 pounds with the affected wrist for 7 daysand no straining the insertion site. Do not life grocery bags or the garbage can, do not run the vacuum  or  for 7 days. Start with short walks as in the hospital and gradually increase as tolerated each day. It is recommended to walk 30 minutes 5-7 days per week. RETURN TO WORK:    You may return to work in two days. See activity precautions above. Information obtained by :  I understand that if any problems occur once I am at home I am to contact my physician. I understand and acknowledge receipt of the instructions indicated above.                                                                                                                                            R.N.'s Signature                                                                  Date/Time                                                                                                                                              Patient or Representative Kelly Rooney MD

## 2019-10-10 LAB — END DIASTOLIC PRESSURE: 14

## 2019-10-17 ENCOUNTER — OFFICE VISIT (OUTPATIENT)
Dept: CARDIOLOGY CLINIC | Age: 69
End: 2019-10-17

## 2019-10-17 VITALS
BODY MASS INDEX: 45.1 KG/M2 | HEIGHT: 70 IN | DIASTOLIC BLOOD PRESSURE: 78 MMHG | WEIGHT: 315 LBS | RESPIRATION RATE: 22 BRPM | SYSTOLIC BLOOD PRESSURE: 144 MMHG | OXYGEN SATURATION: 91 % | HEART RATE: 78 BPM

## 2019-10-17 DIAGNOSIS — J98.09 BRONCHOSPASTIC AIRWAY DISEASE: ICD-10-CM

## 2019-10-17 DIAGNOSIS — I50.32 CHRONIC DIASTOLIC CONGESTIVE HEART FAILURE (HCC): Primary | ICD-10-CM

## 2019-10-17 DIAGNOSIS — E11.9 TYPE 2 DIABETES MELLITUS WITHOUT COMPLICATION, WITH LONG-TERM CURRENT USE OF INSULIN (HCC): ICD-10-CM

## 2019-10-17 DIAGNOSIS — G47.33 OSA TREATED WITH BIPAP: ICD-10-CM

## 2019-10-17 DIAGNOSIS — Z79.4 TYPE 2 DIABETES MELLITUS WITHOUT COMPLICATION, WITH LONG-TERM CURRENT USE OF INSULIN (HCC): ICD-10-CM

## 2019-10-17 DIAGNOSIS — I10 ESSENTIAL HYPERTENSION: ICD-10-CM

## 2019-10-17 DIAGNOSIS — I45.2 RBBB (RIGHT BUNDLE BRANCH BLOCK WITH LEFT ANTERIOR FASCICULAR BLOCK): ICD-10-CM

## 2019-10-17 DIAGNOSIS — E66.01 MORBID (SEVERE) OBESITY DUE TO EXCESS CALORIES (HCC): ICD-10-CM

## 2019-10-17 NOTE — PATIENT INSTRUCTIONS
We are going to repeat your lab work today to check your kidneys. I will call you with the results and if stable, we may consider going up on the Bumex.

## 2019-10-17 NOTE — PROGRESS NOTES
[address], [address] [facility]    Office [phone]  Fax [phone]     Thi Ghotra is a 78 y.o. male first seen by Dr. Rohith Diaz on 7/22/19 fore evaluation of shortness of breath. S/p cardiac cath on 10/8/19. Diagnoses  Encounter Diagnoses     ICD-10-CM ICD-9-CM   1. Chronic diastolic congestive heart failure (HCC) I50.32 428.32     428.0   2. RBBB (right bundle branch block with left anterior fascicular block) I45.2 426.52   3. Essential hypertension I10 401.9   4. Type 2 diabetes mellitus without complication, with long-term current use of insulin (HCC) E11.9 250.00    Z79.4 V58.67   5. JASWINDER treated with BiPAP G47.33 327.23   6. Morbid (severe) obesity due to excess calories ([facility]) E66.01 278.01   7. Bronchospastic airway disease J98.09 519.19       Assessment/Recommendations:  1. Chronic Class 3 HUITRON - multifactorial airway disease (small airway disease based on PFTs), obesity, sleep apnea. Recent echo with mild LV dysfunction - EF 45-50%. There may also be some component of diastolic dysfunction. Recent proBNP from the  (minimally elevated but could be falsely low due to morbid obesity). Mildly elevated LVEDP by recent cath. Small improvement in dyspnea following the addition of Bumex 1 mg daily. He has chronic edema with 2-3 pillow orthopnea. - Update BMP today  - If stable renal function and potassium, will consider a trial of Bumex 2 mg vs. 1 mg BID.    - Continue treatment of JASWINDER, low sodium diet. - Encouraged continued close follow up with Pulmonary Dr. Airam Bunn. 2. Morbid obesity, complicated by B8OA, HTN, JASWINDER. Scheduled for orientation with Dr. Candy Obregon this week. Phone follow up to review lab results and plan moving forward. He was encouraged to call with any new complaints or concerns. Subjective:  Mr. Sergio Grewal reports a small improvement in his dyspnea with the addition of Bumex 1 mg daily.   He reports a minimal urinary response to this dose.  Weight is unchanged. Cough with clear phlegm remains present. He continues to utilize supplemental O2. He leads a sedentary lifestyle. He is retired from [employer] and he gets a lot of his care at the [place]. He is also a former . He reports nightly compliance with BiPAP. He ambulates with a cane. Denies any recent falls. No lightheadedness of dizziness. Presents in a wheelchair today. He denies any exertional chest pain, palpitations, syncope, or paroxysmal nocturnal dyspnea. Cardiac risk factors   HTN yes  DM  yes  Smoking no, former pipe smoker  Family hx of CAD no    Cardiac testing  Echo 3/7/19 - EF 45-50%, mild LVH    Lexiscan Cardiolite 9/6/19 - equivocal perfusion study. Fixed inferior defect likely due to attenuation artifact but infarction cannot be excluded. Cath 10/8/19 - mild CAD. Mildly elevated LVEDP; 14.    Patient Active Problem List    Diagnosis    RBBB (right bundle branch block with left anterior fascicular block)    JASWINDER treated with BiPAP    Essential hypertension    Type 2 diabetes mellitus without complication, with long-term current use of insulin ([facility])    Bronchospastic airway disease    Morbid (severe) obesity due to excess calories ([address]       Past Medical History:   Diagnosis Date    Diabetes ([address]     Hypercholesterolemia     Hypertension     PTSD (post-traumatic stress disorder)         Social History     Socioeconomic History    Marital status:      Spouse name: Not on file    Number of children: Not on file    Years of education: Not on file    Highest education level: Not on file   Tobacco Use    Smoking status: Former Smoker    Smokeless tobacco: Never Used   Substance and Sexual Activity    Alcohol use: Yes     Comment: occasionally       No Known Allergies     Review of Symptoms:  Constitutional: Negative for fever, chills, malaise/fatigue and diaphoresis.    Respiratory: Negative for hemoptysis, sputum production, and wheezing. +HUITRON, cough  Cardiovascular: Negative for chest pain, palpitations, orthopnea, claudication, leg swelling and PND. Gastrointestinal: Negative for heartburn, nausea, vomiting, blood in stool and melena. Genitourinary: Negative for dysuria and flank pain. Musculoskeletal: Negative for joint pain and back pain. Skin: Negative for rash. Neurological: Negative for focal weakness, seizures, loss of consciousness, weakness and headaches. Endo/Heme/Allergies: Does not bruise/bleed easily. Psychiatric/Behavioral: Negative for memory loss. The patient does not have insomnia. Physical Exam  Visit Vitals  /78 (BP 1 Location: Left arm, BP Patient Position: Sitting)   Pulse 78   Resp 22   Ht 5' 10\" (1.778 m)   Wt 330 lb (149.7 kg)   SpO2 91%   BMI 47.35 kg/m²     Wt Readings from Last 3 Encounters:   10/17/19 330 lb (149.7 kg)   10/08/19 329 lb 9.4 oz (149.5 kg)   09/04/19 332 lb (150.6 kg)     General - morbidly obese BM  HEENT: Eyes without jaundice, Hearing intact  Neck - JVP difficult to assess due to thick neck, thyroid nl  Cardiac - normal S1,S2, no murmurs, rubs or gallops.  No clicks  Vascular - carotids without bruits, radials, femorals and pedal pulses equal bilateral  Lungs - clear to auscultation bilaterals, no rales ,wheezing or rhonchi  Abd - soft nontender, no HSM, no abd bruits  Extremities - 2+ pretibial edema bilaterally  Neuro - nonfocal, normal gait  Psych - mood and affect normal    Cardiographics  EKG 5/13/19 - SR, RBBB, LAHB, no significant change from 7/16/10      Christen Thompson, TANISHA

## 2019-10-17 NOTE — PROGRESS NOTES
Room # 0  C/o SOB. Unchanged in severity.   Visit Vitals  /78 (BP 1 Location: Left arm, BP Patient Position: Sitting)   Pulse 78   Resp 22   Ht 5' 10\" (1.778 m)   Wt 330 lb (149.7 kg)   SpO2 91%   BMI 47.35 kg/m²

## 2019-10-18 LAB
BUN SERPL-MCNC: 18 MG/DL (ref 8–27)
BUN/CREAT SERPL: 16 (ref 10–24)
CALCIUM SERPL-MCNC: 9.6 MG/DL (ref 8.6–10.2)
CHLORIDE SERPL-SCNC: 101 MMOL/L (ref 96–106)
CO2 SERPL-SCNC: 29 MMOL/L (ref 20–29)
CREAT SERPL-MCNC: 1.14 MG/DL (ref 0.76–1.27)
GLUCOSE SERPL-MCNC: 77 MG/DL (ref 65–99)
POTASSIUM SERPL-SCNC: 3.9 MMOL/L (ref 3.5–5.2)
SODIUM SERPL-SCNC: 146 MMOL/L (ref 134–144)

## 2019-10-21 NOTE — PROGRESS NOTES
SOB was unchanged during our office visit 10/17/19, following the addition of Bumex 1 mg daily on 10/8/19. R/LHC that day showed mildly elevated LVEDP and mild LV dysfunction by recent Echo. Symptoms are likely multifactorial in the setting of morbid obesity, JASWINDER, and lung disease. Renal function is essentially unchanged on Bumex 1 mg daily. Plan to trial Bumex 1 mg BID x 3 days and then ask that he phone follow up to review how this effects his dyspnea. If unchanged, will resume 1 mg daily and ask that he follow up again with Pulmonary.

## 2019-10-24 ENCOUNTER — TELEPHONE (OUTPATIENT)
Dept: CARDIOLOGY CLINIC | Age: 69
End: 2019-10-24

## 2019-10-24 NOTE — TELEPHONE ENCOUNTER
----- Message from Matias Goodwin NP sent at 10/22/2019 11:06 AM EDT -----  Will mail letter. ----- Message -----  From: Fior Reese RN  Sent: 10/22/2019  10:49 AM EDT  To: Matias Goodwin NP    Unable to leave message  ----- Message -----  From: Maria Fernanda Dee NP  Sent: 10/21/2019  10:22 AM EDT  To: Heri Figueroa RN    SOB was unchanged during our office visit 10/17/19, following the addition of Bumex 1 mg daily on 10/8/19. R/LHC that day showed mildly elevated LVEDP and mild LV dysfunction by recent Echo. Symptoms are likely multifactorial in the setting of morbid obesity, JASWINDER, and lung disease. Renal function is essentially unchanged on Bumex 1 mg daily. Plan to trial Bumex 1 mg BID x 3 days and then ask that he phone follow up to review how this effects his dyspnea. If unchanged, will resume 1 mg daily and ask that he follow up again with Pulmonary.

## 2019-10-29 ENCOUNTER — TELEPHONE (OUTPATIENT)
Dept: CARDIOLOGY CLINIC | Age: 69
End: 2019-10-29

## 2019-10-29 NOTE — TELEPHONE ENCOUNTER
PTIDX2 notified patient to take Bumex 1mg BID for 3 days and call back with update.     SOB is unchanged today on Bumex 1mg daily

## 2019-11-01 ENCOUNTER — TELEPHONE (OUTPATIENT)
Dept: CARDIOLOGY CLINIC | Age: 69
End: 2019-11-01

## 2019-11-01 NOTE — TELEPHONE ENCOUNTER
Patient states that he was asked to call and speak with Dave Aggarwal NP regarding a a medication that he was taking, not other details were given.      Phone: [phone]

## 2019-11-11 ENCOUNTER — OFFICE VISIT (OUTPATIENT)
Dept: FAMILY MEDICINE CLINIC | Age: 69
End: 2019-11-11

## 2019-11-11 ENCOUNTER — HOSPITAL ENCOUNTER (OUTPATIENT)
Dept: LAB | Age: 69
Discharge: HOME | End: 2019-11-11

## 2019-11-11 VITALS
DIASTOLIC BLOOD PRESSURE: 74 MMHG | HEIGHT: 70 IN | TEMPERATURE: 97.8 F | HEART RATE: 75 BPM | OXYGEN SATURATION: 88 % | WEIGHT: 315 LBS | RESPIRATION RATE: 22 BRPM | SYSTOLIC BLOOD PRESSURE: 136 MMHG | BODY MASS INDEX: 45.1 KG/M2

## 2019-11-11 DIAGNOSIS — Z79.4 TYPE 2 DIABETES MELLITUS WITHOUT COMPLICATION, WITH LONG-TERM CURRENT USE OF INSULIN (HCC): Primary | ICD-10-CM

## 2019-11-11 DIAGNOSIS — E66.01 MORBID (SEVERE) OBESITY DUE TO EXCESS CALORIES (HCC): ICD-10-CM

## 2019-11-11 DIAGNOSIS — I10 ESSENTIAL HYPERTENSION: ICD-10-CM

## 2019-11-11 DIAGNOSIS — R53.83 FATIGUE, UNSPECIFIED TYPE: ICD-10-CM

## 2019-11-11 DIAGNOSIS — Z13.220 SCREENING FOR HYPERLIPIDEMIA: ICD-10-CM

## 2019-11-11 DIAGNOSIS — E11.9 TYPE 2 DIABETES MELLITUS WITHOUT COMPLICATION, WITH LONG-TERM CURRENT USE OF INSULIN (HCC): Primary | ICD-10-CM

## 2019-11-11 DIAGNOSIS — Z23 ENCOUNTER FOR IMMUNIZATION: ICD-10-CM

## 2019-11-11 DIAGNOSIS — Z79.4 TYPE 2 DIABETES MELLITUS WITHOUT COMPLICATION, WITH LONG-TERM CURRENT USE OF INSULIN (HCC): ICD-10-CM

## 2019-11-11 DIAGNOSIS — G47.33 OSA TREATED WITH BIPAP: ICD-10-CM

## 2019-11-11 DIAGNOSIS — E11.9 TYPE 2 DIABETES MELLITUS WITHOUT COMPLICATION, WITH LONG-TERM CURRENT USE OF INSULIN (HCC): ICD-10-CM

## 2019-11-11 LAB
25(OH)D3 SERPL-MCNC: 54.7 NG/ML (ref 30–100)
ALBUMIN SERPL-MCNC: 3.7 G/DL (ref 3.5–5)
ALBUMIN/GLOB SERPL: 1 {RATIO} (ref 1.1–2.2)
ALP SERPL-CCNC: 106 U/L (ref 45–117)
ALT SERPL-CCNC: 24 U/L (ref 12–78)
ANION GAP SERPL CALC-SCNC: 4 MMOL/L (ref 5–15)
AST SERPL-CCNC: 17 U/L (ref 15–37)
BILIRUB SERPL-MCNC: 0.4 MG/DL (ref 0.2–1)
BUN SERPL-MCNC: 17 MG/DL (ref 6–20)
BUN/CREAT SERPL: 17 (ref 12–20)
CALCIUM SERPL-MCNC: 8.7 MG/DL (ref 8.5–10.1)
CHLORIDE SERPL-SCNC: 108 MMOL/L (ref 97–108)
CHOLEST SERPL-MCNC: 122 MG/DL
CO2 SERPL-SCNC: 30 MMOL/L (ref 21–32)
CREAT SERPL-MCNC: 1.01 MG/DL (ref 0.7–1.3)
GLOBULIN SER CALC-MCNC: 3.8 G/DL (ref 2–4)
GLUCOSE SERPL-MCNC: 114 MG/DL (ref 65–100)
HDLC SERPL-MCNC: 50 MG/DL
HDLC SERPL: 2.4 {RATIO} (ref 0–5)
LDLC SERPL CALC-MCNC: 41.8 MG/DL (ref 0–100)
LIPID PROFILE,FLP: ABNORMAL
MAGNESIUM SERPL-MCNC: 2.3 MG/DL (ref 1.6–2.4)
POTASSIUM SERPL-SCNC: 3.7 MMOL/L (ref 3.5–5.1)
PROT SERPL-MCNC: 7.5 G/DL (ref 6.4–8.2)
SODIUM SERPL-SCNC: 142 MMOL/L (ref 136–145)
TRIGL SERPL-MCNC: 151 MG/DL (ref ?–150)
TSH SERPL DL<=0.05 MIU/L-ACNC: 1.06 UIU/ML (ref 0.36–3.74)
VLDLC SERPL CALC-MCNC: 30.2 MG/DL

## 2019-11-11 NOTE — PROGRESS NOTES
1. Have you been to the ER, urgent care clinic since your last visit? Hospitalized since your last visit? No    2. Have you seen or consulted any other health care providers outside of the [address] [place] [address] since your last visit? Include any pap smears or colon screening.  [facility]      Chief Complaint   Patient presents with    Weight Management     MSWL initial       Body Weight: 332.3  Body Fat%: 41.1  Muscle Mass Weight: 47.8  Body Water Weight: 74.7  Basal Metabolic Rate: 0851  BMI: 47.68

## 2019-11-11 NOTE — PROGRESS NOTES
[facility] Weight Loss Program Progress Note: Initial Physician Visit     Latosha Gomez is a 78 y.o. male who is here for medical screening for entering the [facility] Program.     CC:  Obesity  Cardiology sent him here to lose weight  Also will be needing aknee replacement at some point    DM  Last a1c was 6.9 on insulin  He takes semaglutide and nph insulin 65 units in am and 50 units in pm    Weight History  I personally reviewed the [place] completed by patient and scanned into media section of chart. It includes duration of their obesity, maximum weight, goal weight and weight gain time line (timing), all of which give the context of their obesity AND a Family History of their obesity. Is their Weight Loss Goal entered in to Comments? Many      Weight loss History  I personally reviewed the Medical Screening Rudy [place] completed by the patient and scanned into media section of chart. It includes number of weight loss attempts, the weight loss program that patients was most successful using, and if they have any hx of anorectic medication use, including OTC supplements. This captures modifying factors. Significant Medical History  Past Medical History:   Diagnosis Date    Depression     Diabetes ([facility]     Hypercholesterolemia     Hypertension     PTSD (post-traumatic stress disorder)     PTSD (post-traumatic stress disorder)        I personally reviewed the Medical Screening [place] completed by the patient and scanned into media section of chart. This allows me to assess associated symptoms that are significant in the assessment of the patient's obesity and the patient's Past Medical History. Outpatient Medications Marked as Taking for the 11/11/19 encounter (Office Visit) with Jeffry Tapia MD   Medication Sig Dispense Refill    semaglutide (OZEMPIC) 1 mg/dose (2 mg/1.5 mL) sub-q pen 1 mg by SubCUTAneous route every seven (7) days.  bumetanide (BUMEX) 1 mg tablet TAKE 1 TABLET BY MOUTH EVERY DAY 45 Tab 3    Oxygen Indications: 2 liters      oxybutynin chloride XL (DITROPAN XL) 10 mg CR tablet TAKE 1 TABLET BY MOUTH EVERY MORNING  99    tamsulosin (FLOMAX) 0.4 mg capsule TAKE ONE CAPSULE BY MOUTH EVERY MORNING  99    ANORO ELLIPTA 62.5-25 mcg/actuation inhaler TAKE 1 PUFF BY MOUTH EVERY DAY  6    dilTIAZem (TIAZAC) 360 mg ER capsule Take 360 mg by mouth daily.  insulin NPH/insulin regular (NOVOLIN 70/30 U-100 INSULIN) 100 unit/mL (70-30) injection by SubCUTAneous route.  albuterol (PROVENTIL VENTOLIN) 2.5 mg /3 mL (0.083 %) nebu by Nebulization route.  albuterol (PROVENTIL HFA, VENTOLIN HFA, PROAIR HFA) 90 mcg/actuation inhaler Take  by inhalation.  traMADol (ULTRAM) 50 mg tablet Take 50 mg by mouth every six (6) hours as needed for Pain.  CALCIUM PO Take 500 mg by mouth daily.  guaiFENesin (ROBITUSSIN) 100 mg/5 mL liquid Take 200 mg by mouth three (3) times daily as needed for Cough.  HYDROPHILIC CREAM EX by Apply Externally route.  loratadine (CLARITIN) 10 mg tablet Take 10 mg by mouth.  magnesium oxide 420 mg tab Take  by mouth.  losartan (COZAAR) 100 mg tablet Take 100 mg by mouth daily.  mometasone (ASMANEX TWISTHALER) 220 mcg (120 doses) aepb inhaler Take  by inhalation.  cholecalciferol (VITAMIN D3) 1,000 unit cap Take  by mouth daily.  multivitamin (ONE A DAY) tablet Take 1 Tab by mouth daily.  rosuvastatin (CRESTOR) 40 mg tablet Take 40 mg by mouth nightly.  sertraline (ZOLOFT) 100 mg tablet Take  by mouth daily.  gabapentin (NEURONTIN) 100 mg capsule Take  by mouth three (3) times daily.  acetaminophen (TYLENOL) 500 mg tablet Take  by mouth every six (6) hours as needed for Pain.  glucose 4 gram chewable tablet Take 15 g by mouth as needed.  aspirin delayed-release 81 mg tablet Take  by mouth daily.       latanoprost (XALATAN) 0.005 % ophthalmic solution Administer 1 Drop to both eyes nightly. SLEEP: 6-8      Significant Psychosocial History   Has a doctor every diagnosed with Binge Eating Disorder, Bulemia or Anorexia? : no     Compliance  Upcoming Travel? no    Social History  Social History     Tobacco Use    Smoking status: Former Smoker    Smokeless tobacco: Never Used   Substance Use Topics    Alcohol use: Yes     Comment: occasionally       Exercise  I personally reviewed the [place] completed by the patient and scanned into media section of chart. Review of Systems  See HPI        Objective  Visit Vitals  /74   Pulse 75   Temp 97.8 °F (36.6 °C) (Oral)   Resp 22   Ht 5' 10\" (1.778 m)   Wt 332 lb 4.8 oz (150.7 kg)   SpO2 (!) 88% Comment: pt is prescribed 2l of o2 but not on him currently   BMI 47.68 kg/m²         Weight Metrics 11/11/2019 11/11/2019 10/17/2019 10/8/2019 9/4/2019 8/21/2019 8/21/2019   Weight - 332 lb 4.8 oz 330 lb 329 lb 9.4 oz 332 lb - 332 lb   Neck Circ (inches) 19.5 - - - - 19 -   Waist Measure Inches 56 - - - - 58.5 -   BMI - 47.68 kg/m2 47.35 kg/m2 47.29 kg/m2 47.64 kg/m2 - 47.64 kg/m2       Labs: See  labs scanned into Media section or in lab section of record      Physical Exam    Vital Signs Reviewed  Weight Management Metrics Reviewed    Appearance: well  HEENT:  Scleral icterus?  no  Neck:  Thyromegaly or nodules? no  Mouth: Large tongue no  Heart:  RRR  Lungs:  clear  Abdomen:     Hepatomegaly? no   Striae present? no  Skin:    Acne?  no   Hirsutism? no   Skin tags? no   Acanthosis Nigricans?  no  Ext:  Edema? no      Assessment & Plan  Encounter Diagnoses   Name Primary?     Type 2 diabetes mellitus without complication, with long-term current use of insulin (HCC) Yes    Essential hypertension     JASWINDER treated with BiPAP     Morbid (severe) obesity due to excess calories (HCC)     Fatigue, unspecified type     Screening for hyperlipidemia     Encounter for immunization        1. labs reviewed w/ patient  2. EKG reviewed w/ patient:   Personally reviewed by JAMIN olivo, abnormal in may 2019. Has been seeing DR Danya Sweet  For shiortness of breath, had a recent cath and no abnormalities found per the patient    QTc = *466sec may 2019 (Upper limit is 440 for males & 460 for females)      3. Medication changes include: stop insulin and cont semaglutide    Based on his history, labs and EKG, Rula Carrillo is  a good candidate for the [facility] Loss Program     25 min of the 45 minutes face to face time with Carla Pinto consisted of counseling & coordinating and/or discussing treatment plans in reference to his obesity The primary encounter diagnosis was Type 2 diabetes mellitus without complication, with long-term current use of insulin ([address] Diagnoses of Essential hypertension, JASWINDER treated with BiPAP, Morbid (severe) obesity due to excess calories ([address] Fatigue, unspecified type, Screening for hyperlipidemia, and Encounter for immunization were also pertinent to this visit.

## 2019-12-09 ENCOUNTER — OFFICE VISIT (OUTPATIENT)
Dept: FAMILY MEDICINE CLINIC | Age: 69
End: 2019-12-09

## 2019-12-09 VITALS
SYSTOLIC BLOOD PRESSURE: 127 MMHG | HEIGHT: 70 IN | DIASTOLIC BLOOD PRESSURE: 60 MMHG | OXYGEN SATURATION: 90 % | BODY MASS INDEX: 45.1 KG/M2 | WEIGHT: 315 LBS | TEMPERATURE: 97.9 F | HEART RATE: 75 BPM | RESPIRATION RATE: 24 BRPM

## 2019-12-09 DIAGNOSIS — I10 ESSENTIAL HYPERTENSION: ICD-10-CM

## 2019-12-09 DIAGNOSIS — G47.33 OSA TREATED WITH BIPAP: ICD-10-CM

## 2019-12-09 DIAGNOSIS — E66.01 MORBID (SEVERE) OBESITY DUE TO EXCESS CALORIES (HCC): ICD-10-CM

## 2019-12-09 DIAGNOSIS — E11.9 TYPE 2 DIABETES MELLITUS WITHOUT COMPLICATION, WITH LONG-TERM CURRENT USE OF INSULIN (HCC): Primary | ICD-10-CM

## 2019-12-09 DIAGNOSIS — R63.4 RAPID WEIGHT LOSS: ICD-10-CM

## 2019-12-09 DIAGNOSIS — Z79.4 TYPE 2 DIABETES MELLITUS WITHOUT COMPLICATION, WITH LONG-TERM CURRENT USE OF INSULIN (HCC): Primary | ICD-10-CM

## 2019-12-09 RX ORDER — METFORMIN HYDROCHLORIDE 500 MG/1
500 TABLET ORAL 2 TIMES DAILY WITH MEALS
Qty: 60 TAB | Refills: 2 | Status: SHIPPED | OUTPATIENT
Start: 2019-12-09 | End: 2020-03-07

## 2019-12-09 NOTE — PROGRESS NOTES
[facility] Progress Note:   F/up Physician Visit    CC: Weight Management      Josie Hanson is a 78 y.o. male who is here for his  f/up physician visit for the VLCD / LCD Program.  He has not been here since the initial visit  He was waiting to hear from our [facility]  He has not been compliant but has been trying to be a little more selective about the foods he has been eating  He has been drinking more water but has not had any of the products    He needs a knee replacement and need th BMI 39 or less  Also cardiology sent him here to lose weight as a need cardiacwise  Dm  Last a1c was 6.9  He had a higher BG this morning  He missed the pm insulin last night          Weight Metrics 12/9/2019 12/9/2019 11/11/2019 11/11/2019 10/17/2019 10/8/2019 9/4/2019   Weight - 326 lb 1.6 oz - 332 lb 4.8 oz 330 lb 329 lb 9.4 oz 332 lb   Neck Circ (inches) 18.5 - 19.5 - - - -   Waist Measure Inches 57.5 - 56 - - - -   BMI - 46.79 kg/m2 - 47.68 kg/m2 47.35 kg/m2 47.29 kg/m2 47.64 kg/m2         Outpatient Medications Marked as Taking for the 12/9/19 encounter (Office Visit) with Destinee Cali MD   Medication Sig Dispense Refill    metFORMIN (GLUCOPHAGE) 500 mg tablet Take 1 Tab by mouth two (2) times daily (with meals). 60 Tab 2    semaglutide (OZEMPIC) 1 mg/dose (2 mg/1.5 mL) sub-q pen 1 mg by SubCUTAneous route every seven (7) days.  bumetanide (BUMEX) 1 mg tablet TAKE 1 TABLET BY MOUTH EVERY DAY 45 Tab 3    Oxygen Indications: 2 liters      oxybutynin chloride XL (DITROPAN XL) 10 mg CR tablet TAKE 1 TABLET BY MOUTH EVERY MORNING  99    tamsulosin (FLOMAX) 0.4 mg capsule TAKE ONE CAPSULE BY MOUTH EVERY MORNING  99    ANORO ELLIPTA 62.5-25 mcg/actuation inhaler TAKE 1 PUFF BY MOUTH EVERY DAY  6    dilTIAZem (TIAZAC) 360 mg ER capsule Take 360 mg by mouth daily.  insulin NPH/insulin regular (NOVOLIN 70/30 U-100 INSULIN) 100 unit/mL (70-30) injection by SubCUTAneous route.       albuterol (PROVENTIL VENTOLIN) 2.5 mg /3 mL (0.083 %) nebu by Nebulization route.  albuterol (PROVENTIL HFA, VENTOLIN HFA, PROAIR HFA) 90 mcg/actuation inhaler Take  by inhalation.  traMADol (ULTRAM) 50 mg tablet Take 50 mg by mouth every six (6) hours as needed for Pain.  CALCIUM PO Take 500 mg by mouth daily.  guaiFENesin (ROBITUSSIN) 100 mg/5 mL liquid Take 200 mg by mouth three (3) times daily as needed for Cough.  HYDROPHILIC CREAM EX by Apply Externally route.  loratadine (CLARITIN) 10 mg tablet Take 10 mg by mouth.  magnesium oxide 420 mg tab Take  by mouth.  losartan (COZAAR) 100 mg tablet Take 100 mg by mouth daily.  mometasone (ASMANEX TWISTHALER) 220 mcg (120 doses) aepb inhaler Take  by inhalation.  cholecalciferol (VITAMIN D3) 1,000 unit cap Take  by mouth daily.  multivitamin (ONE A DAY) tablet Take 1 Tab by mouth daily.  rosuvastatin (CRESTOR) 40 mg tablet Take 40 mg by mouth nightly.  sertraline (ZOLOFT) 100 mg tablet Take  by mouth daily.  gabapentin (NEURONTIN) 100 mg capsule Take  by mouth three (3) times daily.  acetaminophen (TYLENOL) 500 mg tablet Take  by mouth every six (6) hours as needed for Pain.  glucose 4 gram chewable tablet Take 15 g by mouth as needed.  aspirin delayed-release 81 mg tablet Take  by mouth daily.  latanoprost (XALATAN) 0.005 % ophthalmic solution Administer 1 Drop to both eyes nightly. Participation   Did you attend clinic and class last week? no    Review of Systems  Since your last visit, have you experienced any complications? no  If yes, please list:     Are you taking an appetite suppressant? no  If so, is there any Chest Pain, Palpitations or Dizziness? BP Readings from Last 3 Encounters:   12/09/19 127/60   11/11/19 136/74   10/17/19 144/78       SLEEP: 6    Have you received any other medical care this week? no  If yes, where and for what?        Have you discontinued or changed any medicine or dose of your medicine since your last visit with Dr Avery Worthington? no  If yes, where and for what? Diet  How many ounces of calorie-free liquids did you consume each day?  40 oz    How many meal replacements did you take each day? 0    Did you have any problems adhering to the program?  no If yes, please explain:      Exercise  Aerobic exercise: 0 min  Resistance exercise: 0 workouts / week  Any discomfort?  no     If yes, where? Objective  Visit Vitals  /60   Pulse 75   Temp 97.9 °F (36.6 °C) (Oral)   Resp 24   Ht 5' 10\" (1.778 m)   Wt 326 lb 1.6 oz (147.9 kg)   SpO2 90% Comment: Pt is on 2L of o2 but not inplace   BMI 46.79 kg/m²     No LMP for male patient. Physical Exam  Appearance: well,   Mental:A&O x 3, NAD  H:NC/AT,  EENT:   EOMI, PERRL, No scleral icterus  Neck: no bruit or JVD  Lung: clear, No W/R  ABD: soft, active, nontender  Ext:  no Edema  Neuro: nonfocal  Assessment / Plan    Encounter Diagnoses   Name Primary?  Type 2 diabetes mellitus without complication, with long-term current use of insulin (HCC) Yes    Morbid (severe) obesity due to excess calories ([address]     Essential hypertension     JASWINDER treated with BiPAP      Diagnoses and all orders for this visit:    1. Type 2 diabetes mellitus without complication, with long-term current use of insulin (HCC)  -     metFORMIN (GLUCOPHAGE) 500 mg tablet; Take 1 Tab by mouth two (2) times daily (with meals). 2. Morbid (severe) obesity due to excess calories (HCC)  Avoid sweets an d starches which will help lower the weight and the a1c  3. Essential hypertension  Well controlled  4. JASWINDER treated with BiPAP  Cont using the cpap    1. Weight management improved   Progress was reviewed with patient    2.   Labs    Latest results reviewed with patient   Lab slip given to pt for f/up  labs      Over 20 minutes of the 30 minutes face to face time with Vandana Turcios consisted of counseling & coordinating and/or discussing treatment plans in reference to his obesity The primary encounter diagnosis was Type 2 diabetes mellitus without complication, with long-term current use of insulin ([facility] [address] Diagnoses of Morbid (severe) obesity due to excess calories ([address] Essential hypertension, and JASWINDER treated with BiPAP were also pertinent to this visit.

## 2019-12-09 NOTE — PROGRESS NOTES
1. Have you been to the ER, urgent care clinic since your last visit? Hospitalized since your last visit? No    2. Have you seen or consulted any other health care providers outside of the [address] [place] [address] since your last visit? Include any pap smears or colon screening.  No     Chief Complaint   Patient presents with    Weight Management     MS[facility]     Body Weight: 326.1  Body Fat%: 40.7  Muscle Mass Weight: 46.9  Body Water Weight: 43.8  Basal Metabolic Rate: 9814  BMI: 46.79

## 2019-12-17 ENCOUNTER — CLINICAL SUPPORT (OUTPATIENT)
Dept: BARIATRICS/WEIGHT MGMT | Age: 69
End: 2019-12-17

## 2019-12-17 VITALS — WEIGHT: 315 LBS | BODY MASS INDEX: 45.79 KG/M2 | DIASTOLIC BLOOD PRESSURE: 77 MMHG | SYSTOLIC BLOOD PRESSURE: 161 MMHG

## 2019-12-17 DIAGNOSIS — Z79.4 TYPE 2 DIABETES MELLITUS WITHOUT COMPLICATION, WITH LONG-TERM CURRENT USE OF INSULIN (HCC): Primary | ICD-10-CM

## 2019-12-17 DIAGNOSIS — E11.9 TYPE 2 DIABETES MELLITUS WITHOUT COMPLICATION, WITH LONG-TERM CURRENT USE OF INSULIN (HCC): Primary | ICD-10-CM

## 2019-12-17 DIAGNOSIS — I10 ESSENTIAL HYPERTENSION: ICD-10-CM

## 2019-12-17 DIAGNOSIS — G47.33 OSA TREATED WITH BIPAP: ICD-10-CM

## 2019-12-17 DIAGNOSIS — E66.01 MORBID (SEVERE) OBESITY DUE TO EXCESS CALORIES (HCC): ICD-10-CM

## 2020-01-07 ENCOUNTER — CLINICAL SUPPORT (OUTPATIENT)
Dept: BARIATRICS/WEIGHT MGMT | Age: 70
End: 2020-01-07

## 2020-01-07 VITALS
WEIGHT: 310.2 LBS | SYSTOLIC BLOOD PRESSURE: 146 MMHG | BODY MASS INDEX: 44.51 KG/M2 | DIASTOLIC BLOOD PRESSURE: 76 MMHG | HEART RATE: 80 BPM

## 2020-01-07 DIAGNOSIS — E66.01 MORBID (SEVERE) OBESITY DUE TO EXCESS CALORIES (HCC): ICD-10-CM

## 2020-01-07 DIAGNOSIS — Z79.4 TYPE 2 DIABETES MELLITUS WITHOUT COMPLICATION, WITH LONG-TERM CURRENT USE OF INSULIN (HCC): Primary | ICD-10-CM

## 2020-01-07 DIAGNOSIS — G47.33 OSA TREATED WITH BIPAP: ICD-10-CM

## 2020-01-07 DIAGNOSIS — E11.9 TYPE 2 DIABETES MELLITUS WITHOUT COMPLICATION, WITH LONG-TERM CURRENT USE OF INSULIN (HCC): Primary | ICD-10-CM

## 2020-01-07 DIAGNOSIS — I45.2 RBBB (RIGHT BUNDLE BRANCH BLOCK WITH LEFT ANTERIOR FASCICULAR BLOCK): ICD-10-CM

## 2020-01-07 DIAGNOSIS — I10 ESSENTIAL HYPERTENSION: ICD-10-CM

## 2020-01-09 NOTE — PROGRESS NOTES
Nurse note from patient's weekly VLCD / LCD / Maintenance class was reviewed. Pertinent medical concerns were:   none     BP Readings from Last 3 Encounters:   01/07/20 146/76   12/17/19 161/77   12/09/19 127/60       Weight Metrics 1/7/2020 12/17/2019 12/9/2019 12/9/2019 11/11/2019 11/11/2019 10/17/2019   Weight 310 lb 3.2 oz 319 lb 1.6 oz - 326 lb 1.6 oz - 332 lb 4.8 oz 330 lb   Neck Circ (inches) - - 18.5 - 19.5 - -   Waist Measure Inches - - 57.5 - 56 - -   BMI 44.51 kg/m2 45.79 kg/m2 - 46.79 kg/m2 - 47.68 kg/m2 47.35 kg/m2       Current Outpatient Medications   Medication Sig Dispense Refill    metFORMIN (GLUCOPHAGE) 500 mg tablet Take 1 Tab by mouth two (2) times daily (with meals). 60 Tab 2    semaglutide (OZEMPIC) 1 mg/dose (2 mg/1.5 mL) sub-q pen 1 mg by SubCUTAneous route every seven (7) days.  bumetanide (BUMEX) 1 mg tablet TAKE 1 TABLET BY MOUTH EVERY DAY 45 Tab 3    Oxygen Indications: 2 liters      oxybutynin chloride XL (DITROPAN XL) 10 mg CR tablet TAKE 1 TABLET BY MOUTH EVERY MORNING  99    tamsulosin (FLOMAX) 0.4 mg capsule TAKE ONE CAPSULE BY MOUTH EVERY MORNING  99    ANORO ELLIPTA 62.5-25 mcg/actuation inhaler TAKE 1 PUFF BY MOUTH EVERY DAY  6    dilTIAZem (TIAZAC) 360 mg ER capsule Take 360 mg by mouth daily.  insulin NPH/insulin regular (NOVOLIN 70/30 U-100 INSULIN) 100 unit/mL (70-30) injection by SubCUTAneous route.  albuterol (PROVENTIL VENTOLIN) 2.5 mg /3 mL (0.083 %) nebu by Nebulization route.  albuterol (PROVENTIL HFA, VENTOLIN HFA, PROAIR HFA) 90 mcg/actuation inhaler Take  by inhalation.  traMADol (ULTRAM) 50 mg tablet Take 50 mg by mouth every six (6) hours as needed for Pain.  CALCIUM PO Take 500 mg by mouth daily.  guaiFENesin (ROBITUSSIN) 100 mg/5 mL liquid Take 200 mg by mouth three (3) times daily as needed for Cough.  HYDROPHILIC CREAM EX by Apply Externally route.  loratadine (CLARITIN) 10 mg tablet Take 10 mg by mouth.  magnesium oxide 420 mg tab Take  by mouth.  losartan (COZAAR) 100 mg tablet Take 100 mg by mouth daily.  mometasone (ASMANEX TWISTHALER) 220 mcg (120 doses) aepb inhaler Take  by inhalation.  cholecalciferol (VITAMIN D3) 1,000 unit cap Take  by mouth daily.  multivitamin (ONE A DAY) tablet Take 1 Tab by mouth daily.  rosuvastatin (CRESTOR) 40 mg tablet Take 40 mg by mouth nightly.  sertraline (ZOLOFT) 100 mg tablet Take  by mouth daily.  gabapentin (NEURONTIN) 100 mg capsule Take  by mouth three (3) times daily.  acetaminophen (TYLENOL) 500 mg tablet Take  by mouth every six (6) hours as needed for Pain.  glucose 4 gram chewable tablet Take 15 g by mouth as needed.  aspirin delayed-release 81 mg tablet Take  by mouth daily.  latanoprost (XALATAN) 0.005 % ophthalmic solution Administer 1 Drop to both eyes nightly.

## 2020-01-10 NOTE — PROGRESS NOTES
Nurse note from patient's weekly VLCD / LCD / Maintenance class was reviewed. Pertinent medical concerns were:  No enough water     BP Readings from Last 3 Encounters:   01/07/20 146/76   12/17/19 161/77   12/09/19 127/60       Weight Metrics 1/7/2020 12/17/2019 12/9/2019 12/9/2019 11/11/2019 11/11/2019 10/17/2019   Weight 310 lb 3.2 oz 319 lb 1.6 oz - 326 lb 1.6 oz - 332 lb 4.8 oz 330 lb   Neck Circ (inches) - - 18.5 - 19.5 - -   Waist Measure Inches - - 57.5 - 56 - -   BMI 44.51 kg/m2 45.79 kg/m2 - 46.79 kg/m2 - 47.68 kg/m2 47.35 kg/m2       Current Outpatient Medications   Medication Sig Dispense Refill    metFORMIN (GLUCOPHAGE) 500 mg tablet Take 1 Tab by mouth two (2) times daily (with meals). 60 Tab 2    semaglutide (OZEMPIC) 1 mg/dose (2 mg/1.5 mL) sub-q pen 1 mg by SubCUTAneous route every seven (7) days.  bumetanide (BUMEX) 1 mg tablet TAKE 1 TABLET BY MOUTH EVERY DAY 45 Tab 3    Oxygen Indications: 2 liters      oxybutynin chloride XL (DITROPAN XL) 10 mg CR tablet TAKE 1 TABLET BY MOUTH EVERY MORNING  99    tamsulosin (FLOMAX) 0.4 mg capsule TAKE ONE CAPSULE BY MOUTH EVERY MORNING  99    ANORO ELLIPTA 62.5-25 mcg/actuation inhaler TAKE 1 PUFF BY MOUTH EVERY DAY  6    dilTIAZem (TIAZAC) 360 mg ER capsule Take 360 mg by mouth daily.  insulin NPH/insulin regular (NOVOLIN 70/30 U-100 INSULIN) 100 unit/mL (70-30) injection by SubCUTAneous route.  albuterol (PROVENTIL VENTOLIN) 2.5 mg /3 mL (0.083 %) nebu by Nebulization route.  albuterol (PROVENTIL HFA, VENTOLIN HFA, PROAIR HFA) 90 mcg/actuation inhaler Take  by inhalation.  traMADol (ULTRAM) 50 mg tablet Take 50 mg by mouth every six (6) hours as needed for Pain.  CALCIUM PO Take 500 mg by mouth daily.  guaiFENesin (ROBITUSSIN) 100 mg/5 mL liquid Take 200 mg by mouth three (3) times daily as needed for Cough.  HYDROPHILIC CREAM EX by Apply Externally route.       loratadine (CLARITIN) 10 mg tablet Take 10 mg by mouth.      magnesium oxide 420 mg tab Take  by mouth.  losartan (COZAAR) 100 mg tablet Take 100 mg by mouth daily.  mometasone (ASMANEX TWISTHALER) 220 mcg (120 doses) aepb inhaler Take  by inhalation.  cholecalciferol (VITAMIN D3) 1,000 unit cap Take  by mouth daily.  multivitamin (ONE A DAY) tablet Take 1 Tab by mouth daily.  rosuvastatin (CRESTOR) 40 mg tablet Take 40 mg by mouth nightly.  sertraline (ZOLOFT) 100 mg tablet Take  by mouth daily.  gabapentin (NEURONTIN) 100 mg capsule Take  by mouth three (3) times daily.  acetaminophen (TYLENOL) 500 mg tablet Take  by mouth every six (6) hours as needed for Pain.  glucose 4 gram chewable tablet Take 15 g by mouth as needed.  aspirin delayed-release 81 mg tablet Take  by mouth daily.  latanoprost (XALATAN) 0.005 % ophthalmic solution Administer 1 Drop to both eyes nightly.

## 2020-01-16 ENCOUNTER — CLINICAL SUPPORT (OUTPATIENT)
Dept: BARIATRICS/WEIGHT MGMT | Age: 70
End: 2020-01-16

## 2020-01-16 VITALS — SYSTOLIC BLOOD PRESSURE: 136 MMHG | DIASTOLIC BLOOD PRESSURE: 74 MMHG | WEIGHT: 309.8 LBS | BODY MASS INDEX: 44.45 KG/M2

## 2020-01-16 DIAGNOSIS — E66.01 MORBID (SEVERE) OBESITY DUE TO EXCESS CALORIES (HCC): ICD-10-CM

## 2020-01-16 DIAGNOSIS — E11.9 TYPE 2 DIABETES MELLITUS WITHOUT COMPLICATION, WITH LONG-TERM CURRENT USE OF INSULIN (HCC): Primary | ICD-10-CM

## 2020-01-16 DIAGNOSIS — G47.33 OSA TREATED WITH BIPAP: ICD-10-CM

## 2020-01-16 DIAGNOSIS — Z79.4 TYPE 2 DIABETES MELLITUS WITHOUT COMPLICATION, WITH LONG-TERM CURRENT USE OF INSULIN (HCC): Primary | ICD-10-CM

## 2020-01-20 ENCOUNTER — HOSPITAL ENCOUNTER (OUTPATIENT)
Dept: LAB | Age: 70
Discharge: HOME | End: 2020-01-20

## 2020-01-20 ENCOUNTER — OFFICE VISIT (OUTPATIENT)
Dept: FAMILY MEDICINE CLINIC | Age: 70
End: 2020-01-20

## 2020-01-20 VITALS
TEMPERATURE: 97.9 F | HEIGHT: 70 IN | BODY MASS INDEX: 43.69 KG/M2 | OXYGEN SATURATION: 90 % | DIASTOLIC BLOOD PRESSURE: 75 MMHG | HEART RATE: 82 BPM | RESPIRATION RATE: 18 BRPM | SYSTOLIC BLOOD PRESSURE: 159 MMHG | WEIGHT: 305.2 LBS

## 2020-01-20 DIAGNOSIS — R63.4 LOSS OF WEIGHT: ICD-10-CM

## 2020-01-20 DIAGNOSIS — I10 ESSENTIAL HYPERTENSION: ICD-10-CM

## 2020-01-20 DIAGNOSIS — E66.01 MORBID OBESITY (HCC): ICD-10-CM

## 2020-01-20 DIAGNOSIS — E66.01 MORBID (SEVERE) OBESITY DUE TO EXCESS CALORIES (HCC): ICD-10-CM

## 2020-01-20 DIAGNOSIS — R63.4 RAPID WEIGHT LOSS: ICD-10-CM

## 2020-01-20 DIAGNOSIS — Z79.4 TYPE 2 DIABETES MELLITUS WITHOUT COMPLICATION, WITH LONG-TERM CURRENT USE OF INSULIN (HCC): Primary | ICD-10-CM

## 2020-01-20 DIAGNOSIS — E11.9 DIABETES MELLITUS WITH NO COMPLICATION (HCC): ICD-10-CM

## 2020-01-20 DIAGNOSIS — G47.33 OSA TREATED WITH BIPAP: ICD-10-CM

## 2020-01-20 DIAGNOSIS — Z79.4 ENCOUNTER FOR LONG-TERM (CURRENT) USE OF INSULIN (HCC): ICD-10-CM

## 2020-01-20 DIAGNOSIS — E11.9 TYPE 2 DIABETES MELLITUS WITHOUT COMPLICATION, WITH LONG-TERM CURRENT USE OF INSULIN (HCC): Primary | ICD-10-CM

## 2020-01-20 LAB
ALBUMIN SERPL-MCNC: 3.7 G/DL (ref 3.5–5)
ALBUMIN/GLOB SERPL: 0.9 {RATIO} (ref 1.1–2.2)
ALP SERPL-CCNC: 94 U/L (ref 45–117)
ALT SERPL-CCNC: 20 U/L (ref 12–78)
ANION GAP SERPL CALC-SCNC: 6 MMOL/L (ref 5–15)
AST SERPL-CCNC: 12 U/L (ref 15–37)
BILIRUB SERPL-MCNC: 0.6 MG/DL (ref 0.2–1)
BUN SERPL-MCNC: 15 MG/DL (ref 6–20)
BUN/CREAT SERPL: 14 (ref 12–20)
CALCIUM SERPL-MCNC: 9.5 MG/DL (ref 8.5–10.1)
CHLORIDE SERPL-SCNC: 102 MMOL/L (ref 97–108)
CO2 SERPL-SCNC: 32 MMOL/L (ref 21–32)
CREAT SERPL-MCNC: 1.09 MG/DL (ref 0.7–1.3)
EST. AVERAGE GLUCOSE BLD GHB EST-MCNC: 177 MG/DL
GLOBULIN SER CALC-MCNC: 4 G/DL (ref 2–4)
GLUCOSE SERPL-MCNC: 175 MG/DL (ref 65–100)
HBA1C MFR BLD: 7.8 % (ref 4–5.6)
MAGNESIUM SERPL-MCNC: 1.8 MG/DL (ref 1.6–2.4)
POTASSIUM SERPL-SCNC: 3.6 MMOL/L (ref 3.5–5.1)
PROT SERPL-MCNC: 7.7 G/DL (ref 6.4–8.2)
SODIUM SERPL-SCNC: 140 MMOL/L (ref 136–145)
URATE SERPL-MCNC: 3.9 MG/DL (ref 3.5–7.2)

## 2020-01-20 NOTE — LETTER
2/5/2020 11:05 AM 
 
Mr. Tarah Urbina 
[address] [place] [address] [address] [id number] Dear Tarah Urbina: 
 
Please find your most recent results below. Resulted Orders HEMOGLOBIN A1C WITH EAG (Collected: 1/20/2020 12:07 PM) Result Value Ref Range Hemoglobin A1c 7.8 (H) 4.0 - 5.6 % Comment: NEW METHOD 
PLEASE NOTE NEW REFERENCE RANGE 
(NOTE) HbA1C Interpretive Ranges <5.7              Normal 
5.7 - 6.4         Consider Prediabetes >6.5              Consider Diabetes Est. average glucose 177 mg/dL RECOMMENDATIONS: 
 
The blood sugar control is pretty good-a1c is 7.8. as it gets better we can start reducing medication but not yet. The kidney and liver are normal. 
Have blood tests repeated 2-3 days prior to the next visit Please call me if you have any questions: [phone] Sincerely,

## 2020-01-20 NOTE — PROGRESS NOTES
[facility] Progress Note:   F/up Physician Visit    CC: Weight Management      Salima Staff is a 78 y.o. male who is here for his  f/up physician visit for the VLCD / LCD Program.    Weight Metrics 1/22/2020 1/20/2020 1/20/2020 1/16/2020 1/7/2020 12/17/2019 12/9/2019   Weight 306 lb - 305 lb 3.2 oz 309 lb 12.8 oz 310 lb 3.2 oz 319 lb 1.6 oz -   Neck Circ (inches) - 17 - - - - 18.5   Waist Measure Inches - 56 - - - - 57.5   BMI 43.91 kg/m2 - 43.79 kg/m2 44.45 kg/m2 44.51 kg/m2 45.79 kg/m2 -         Outpatient Medications Marked as Taking for the 1/20/20 encounter (Office Visit) with Asuncion Castleman, MD   Medication Sig Dispense Refill    metFORMIN (GLUCOPHAGE) 500 mg tablet Take 1 Tab by mouth two (2) times daily (with meals). 60 Tab 2    semaglutide (OZEMPIC) 1 mg/dose (2 mg/1.5 mL) sub-q pen 1 mg by SubCUTAneous route every seven (7) days.  bumetanide (BUMEX) 1 mg tablet TAKE 1 TABLET BY MOUTH EVERY DAY 45 Tab 3    Oxygen Indications: 2 liters      oxybutynin chloride XL (DITROPAN XL) 10 mg CR tablet TAKE 1 TABLET BY MOUTH EVERY MORNING  99    tamsulosin (FLOMAX) 0.4 mg capsule TAKE ONE CAPSULE BY MOUTH EVERY MORNING  99    ANORO ELLIPTA 62.5-25 mcg/actuation inhaler TAKE 1 PUFF BY MOUTH EVERY DAY  6    dilTIAZem (TIAZAC) 360 mg ER capsule Take 360 mg by mouth daily.  insulin NPH/insulin regular (NOVOLIN 70/30 U-100 INSULIN) 100 unit/mL (70-30) injection by SubCUTAneous route.  albuterol (PROVENTIL VENTOLIN) 2.5 mg /3 mL (0.083 %) nebu by Nebulization route.  albuterol (PROVENTIL HFA, VENTOLIN HFA, PROAIR HFA) 90 mcg/actuation inhaler Take  by inhalation.  traMADol (ULTRAM) 50 mg tablet Take 50 mg by mouth every six (6) hours as needed for Pain.  CALCIUM PO Take 500 mg by mouth daily.  guaiFENesin (ROBITUSSIN) 100 mg/5 mL liquid Take 200 mg by mouth three (3) times daily as needed for Cough.  HYDROPHILIC CREAM EX by Apply Externally route.       loratadine (CLARITIN) 10 mg tablet Take 10 mg by mouth.  magnesium oxide 420 mg tab Take  by mouth.  losartan (COZAAR) 100 mg tablet Take 100 mg by mouth daily.  mometasone (ASMANEX TWISTHALER) 220 mcg (120 doses) aepb inhaler Take  by inhalation.  cholecalciferol (VITAMIN D3) 1,000 unit cap Take  by mouth daily.  multivitamin (ONE A DAY) tablet Take 1 Tab by mouth daily.  rosuvastatin (CRESTOR) 40 mg tablet Take 40 mg by mouth nightly.  sertraline (ZOLOFT) 100 mg tablet Take  by mouth daily.  gabapentin (NEURONTIN) 100 mg capsule Take  by mouth two (2) times a day.  acetaminophen (TYLENOL) 500 mg tablet Take  by mouth every six (6) hours as needed for Pain.  glucose 4 gram chewable tablet Take 15 g by mouth as needed.  aspirin delayed-release 81 mg tablet Take  by mouth daily.  latanoprost (XALATAN) 0.005 % ophthalmic solution Administer 1 Drop to both eyes nightly. Getting 6-8 hours sleep    10-15 min exercise - little walking and chair exercise  2-3 poroducts a day and mostly a salad in afternoon    Participation   Did you attend clinic and class last week? no    Review of Systems  Since your last visit, have you experienced any complications? no  If yes, please list:       Are you taking an appetite suppressant? no  If so, is there any Chest Pain, Palpitations or Dizziness? BP Readings from Last 3 Encounters:   01/22/20 152/80   01/20/20 159/75   01/16/20 136/74       SLEEP:    Have you received any other medical care this week? no  If yes, where and for what? Have you discontinued or changed any medicine or dose of your medicine since your last visit with Dr Carmela Mcgarry? no  If yes, where and for what? Diet  How many ounces of calorie-free liquids did you consume each day? 20-32 oz    How many meal replacements did you take each day?  2-3    Did you have any problems adhering to the program?  no If yes, please explain:      Exercise  Aerobic exercise: see above min  Resistance exercise:  workouts / week  Any discomfort?  no     If yes, where? Objective  Visit Vitals  /75 (BP 1 Location: Right arm, BP Patient Position: Sitting)   Pulse 82   Temp 97.9 °F (36.6 °C) (Oral)   Resp 18   Ht 5' 10\" (1.778 m)   Wt 305 lb 3.2 oz (138.4 kg)   SpO2 90%   BMI 43.79 kg/m²     No LMP for male patient. Physical Exam  Appearance: well,   Mental:A&O x 3, NAD  H:NC/AT,  EENT:   EOMI, PERRL, No scleral icterus  Neck: no bruit or JVD  Lung: clear, No W/R  ABD: soft, active, nontender  Ext:  no Edema  Neuro: nonfocal  Assessment / Plan    Encounter Diagnoses   Name Primary?  Type 2 diabetes mellitus without complication, with long-term current use of insulin (HCC) Yes    Morbid (severe) obesity due to excess calories ([address]     Essential hypertension     JASWINDER treated with BiPAP     Rapid weight loss      Diagnoses and all orders for this visit:    1. Type 2 diabetes mellitus without complication, with long-term current use of insulin (HCC)  a1c below 8, aiming for less than 6  2. Morbid (severe) obesity due to excess calories ([address]  Start 332 and now 206 over 4 months   3. Essential hypertension  bp slightly high  4. JASWINDER treated with BiPAP  I encourage to use cpap  5. Rapid weight loss      1. Weight management improved   Progress was reviewed with patient    2. Labs    Latest results reviewed with patient   Lab slip given to pt for f/up  labs      Over 20 minutes of the 30 minutes face to face time with Joel Nelson consisted of counseling & coordinating and/or discussing treatment plans in reference to his obesity The primary encounter diagnosis was Type 2 diabetes mellitus without complication, with long-term current use of insulin ([address] Diagnoses of Morbid (severe) obesity due to excess calories ([address] Essential hypertension, JASWINDER treated with BiPAP, and Rapid weight loss were also pertinent to this visit.

## 2020-01-20 NOTE — PROGRESS NOTES
Tarah Urbina is a 78 y.o. male    Chief Complaint   Patient presents with    Weight Management     1. Have you been to the ER, urgent care clinic since your last visit? Hospitalized since your last visit? No    2. Have you seen or consulted any other health care providers outside of the [address] [place] [address] since your last visit? Include any pap smears or colon screening.  No     Body Weight: 305.2    Body Fat: 39.0    BMI: 43.9      Water %: 41.9

## 2020-01-21 NOTE — PROGRESS NOTES
[address], [id number] [facility]    Office [phone]  Fax [phone]      Cary Valencia is a 78 y.o. male last seen 3 months ago. Diagnoses  Encounter Diagnoses     ICD-10-CM ICD-9-CM   1. LV dysfunction I51.9 429.9   2. Essential hypertension I10 401.9   3. Type 2 diabetes mellitus without complication, with long-term current use of insulin (HCC) E11.9 250.00    Z79.4 V58.67   4. JASWINDER treated with BiPAP G47.33 327.23   5. Morbid (severe) obesity due to excess calories ([facility]) E66.01 278.01   6. RBBB (right bundle branch block with left anterior fascicular block) I45.2 426.52   7. Bronchospastic airway disease J98.09 519.19   8. Coronary artery disease involving native coronary artery of native heart without angina pectoris I25.10 414.01       Assessment/Recommendations:  1. Chronic HUITRON; Class 2-3; recently improved following > 20 pounds weight loss. Hx of bronchospastic airway disease followed by Dr. Charlette Hidalgo and hx of JASWINDER. Echo 3/2019 with mild LV dysfunction - 45-50%. There is likely a component of diastolic dysfunction. Baseline proBNP 5/2019 was 167 (minimally elevated but could be falsely low due to morbid obesity). Mildly elevated LVEDP and non-obstructive CAD by cath 9/2019.    - Continue nightly CPAP  - Continue routine Pulmonary follow up  - Continued weight loss - followed by Dr. Eron Laird. 2. Mild LV dysfunction - improved HUITRON (as noted above). No other s/sxs of HF. Body habitus make physical exam challenging but previously seen LESLYE has now resolved. - Will update Echo again in 3 months with next office visit. - Continue Bumex 1 mg daily - recent BMP with stable renal function. Low normal potassium.    - Continue Losartan 100 mg daily     3. HTN - elevated in the office today. Not monitoring at home.   - Start home monitoring now. Asked him to phone follow up in 2 weeks to discuss BP trends.  If remains > 140 consistently, would consider the addition of BB (LV dysfunction), but would need to closely monitor underlying pulmonary disease. Could also consider the role of Aldactone with mild hypokalemia by recent lab work. 3. Non-obstructive CAD - no exertional angina.   - Continue ASA and statin therapy     4. DM - insulin requirement decreased, now off Insulin. On Metformin and Ozempic. Subjective:  [facility] Staff reports interval improvement in dyspnea. He has successfully lost ~ 20 pounds in the past 3 months under the guidance of Dr. Marylin Costa. He denies any cough, orthopnea, PND or edema. No exertional chest pain, palpitations, syncope or near syncope. No lightheadedness. No recent falls. His activity level has become more limited (using a can and wheelchair during our visit today) due to right knee pain. He continues to utilize supplemental O2. He reports nightly compliance with BiPAP. Cardiac risk factors   HTN yes  DM  yes  Smoking no, former pipe smoker  Family hx of CAD no    Cardiac testing  Echo 3/7/19 - EF 45-50%, mild LVH    Lexiscan Cardiolite 9/6/19 - equivocal perfusion study. Fixed inferior defect likely due to attenuation artifact but infarction cannot be excluded. Cath 10/8/19 - mild CAD.  Mildly elevated LVEDP; 14.    Patient Active Problem List    Diagnosis    CAD (coronary artery disease)    RBBB (right bundle branch block with left anterior fascicular block)    JASWINDER treated with BiPAP    Essential hypertension    Type 2 diabetes mellitus without complication, with long-term current use of insulin ([address]    Bronchospastic airway disease    Morbid (severe) obesity due to excess calories ([address]       Past Medical History:   Diagnosis Date    Depression     Diabetes ([address]     Hypercholesterolemia     Hypertension     PTSD (post-traumatic stress disorder)     PTSD (post-traumatic stress disorder)         Social History     Socioeconomic History    Marital status:      Spouse name: Not on file    Number of children: Not on file    Years of education: Not on file    Highest education level: Not on file   Tobacco Use    Smoking status: Former Smoker    Smokeless tobacco: Never Used   Substance and Sexual Activity    Alcohol use: Yes     Comment: occasionally       No Known Allergies     Review of Symptoms:  Constitutional: Negative for fever, chills, malaise/fatigue and diaphoresis. Respiratory: Negative for hemoptysis, sputum production, and wheezing. +HUITRON, cough  Cardiovascular: Negative for chest pain, palpitations, orthopnea, claudication, leg swelling and PND. Gastrointestinal: Negative for heartburn, nausea, vomiting, blood in stool and melena. Genitourinary: Negative for dysuria and flank pain. Musculoskeletal: Negative for joint pain and back pain. Skin: Negative for rash. Neurological: Negative for focal weakness, seizures, loss of consciousness, weakness and headaches. Endo/Heme/Allergies: Does not bruise/bleed easily. Psychiatric/Behavioral: Negative for memory loss. The patient does not have insomnia. Physical Exam  Visit Vitals  /80   Pulse 80   Ht 5' 10\" (1.778 m)   Wt 306 lb (138.8 kg)   SpO2 92%   BMI 43.91 kg/m²     Wt Readings from Last 3 Encounters:   01/22/20 306 lb (138.8 kg)   01/20/20 305 lb 3.2 oz (138.4 kg)   01/16/20 309 lb 12.8 oz (140.5 kg)     General - morbidly obese BM  HEENT: Eyes without jaundice, Hearing intact  Neck - JVP difficult to assess due to thick neck, thyroid nl  Cardiac - normal S1,S2, no murmurs, rubs or gallops.  No clicks  Vascular - carotids without bruits, radials, femorals and pedal pulses equal bilateral  Lungs - clear to auscultation bilaterals, no rales ,wheezing or rhonchi  Abd - obese, soft nontender  Extremities - no edema  Neuro - nonfocal, normal gait  Psych - mood and affect normal    Cardiographics  EKG 5/13/19 - SR, RBBB, LAHB, no significant change from 7/16/10    TANISHA Moreira MD

## 2020-01-22 ENCOUNTER — OFFICE VISIT (OUTPATIENT)
Dept: CARDIOLOGY CLINIC | Age: 70
End: 2020-01-22

## 2020-01-22 VITALS
BODY MASS INDEX: 43.81 KG/M2 | SYSTOLIC BLOOD PRESSURE: 152 MMHG | HEIGHT: 70 IN | WEIGHT: 306 LBS | HEART RATE: 80 BPM | OXYGEN SATURATION: 92 % | DIASTOLIC BLOOD PRESSURE: 80 MMHG

## 2020-01-22 DIAGNOSIS — I25.10 CORONARY ARTERY DISEASE INVOLVING NATIVE CORONARY ARTERY OF NATIVE HEART WITHOUT ANGINA PECTORIS: ICD-10-CM

## 2020-01-22 DIAGNOSIS — I45.2 RBBB (RIGHT BUNDLE BRANCH BLOCK WITH LEFT ANTERIOR FASCICULAR BLOCK): ICD-10-CM

## 2020-01-22 DIAGNOSIS — G47.33 OSA TREATED WITH BIPAP: ICD-10-CM

## 2020-01-22 DIAGNOSIS — I10 ESSENTIAL HYPERTENSION: ICD-10-CM

## 2020-01-22 DIAGNOSIS — I51.9 LV DYSFUNCTION: Primary | ICD-10-CM

## 2020-01-22 DIAGNOSIS — E66.01 MORBID (SEVERE) OBESITY DUE TO EXCESS CALORIES (HCC): ICD-10-CM

## 2020-01-22 DIAGNOSIS — Z79.4 TYPE 2 DIABETES MELLITUS WITHOUT COMPLICATION, WITH LONG-TERM CURRENT USE OF INSULIN (HCC): ICD-10-CM

## 2020-01-22 DIAGNOSIS — E11.9 TYPE 2 DIABETES MELLITUS WITHOUT COMPLICATION, WITH LONG-TERM CURRENT USE OF INSULIN (HCC): ICD-10-CM

## 2020-01-22 DIAGNOSIS — J98.09 BRONCHOSPASTIC AIRWAY DISEASE: ICD-10-CM

## 2020-01-22 NOTE — PATIENT INSTRUCTIONS
Start to measure your BP at home - using your wrist cuff or purchasing an upper arm cuff (OMROM is a good brand that we recommend). Please call me or Clerts!t message me in 2 weeks to let me know how your blood pressures are looking. We will need to add a 3rd medication at that time is your top numbers are > 140 consistently. We will then see you back in 3 months with another Echo.

## 2020-01-22 NOTE — PROGRESS NOTES
Visit Vitals  /80   Pulse 80   Ht 5' 10\" (1.778 m)   Wt 306 lb (138.8 kg)   SpO2 92%   BMI 43.91 kg/m²     Denies CP,Edema. SOB about the same.  O2 used at home 2L

## 2020-01-22 NOTE — PROGRESS NOTES
Nurse note from patient's weekly VLCD / LCD / Maintenance class was reviewed. Pertinent medical concerns were:        BP Readings from Last 3 Encounters:   01/20/20 159/75   01/16/20 136/74   01/07/20 146/76       Weight Metrics 1/20/2020 1/20/2020 1/16/2020 1/7/2020 12/17/2019 12/9/2019 12/9/2019   Weight - 305 lb 3.2 oz 309 lb 12.8 oz 310 lb 3.2 oz 319 lb 1.6 oz - 326 lb 1.6 oz   Neck Circ (inches) 17 - - - - 18.5 -   Waist Measure Inches 56 - - - - 57.5 -   BMI - 43.79 kg/m2 44.45 kg/m2 44.51 kg/m2 45.79 kg/m2 - 46.79 kg/m2       Current Outpatient Medications   Medication Sig Dispense Refill    metFORMIN (GLUCOPHAGE) 500 mg tablet Take 1 Tab by mouth two (2) times daily (with meals). 60 Tab 2    semaglutide (OZEMPIC) 1 mg/dose (2 mg/1.5 mL) sub-q pen 1 mg by SubCUTAneous route every seven (7) days.  bumetanide (BUMEX) 1 mg tablet TAKE 1 TABLET BY MOUTH EVERY DAY 45 Tab 3    Oxygen Indications: 2 liters      oxybutynin chloride XL (DITROPAN XL) 10 mg CR tablet TAKE 1 TABLET BY MOUTH EVERY MORNING  99    tamsulosin (FLOMAX) 0.4 mg capsule TAKE ONE CAPSULE BY MOUTH EVERY MORNING  99    ANORO ELLIPTA 62.5-25 mcg/actuation inhaler TAKE 1 PUFF BY MOUTH EVERY DAY  6    dilTIAZem (TIAZAC) 360 mg ER capsule Take 360 mg by mouth daily.  insulin NPH/insulin regular (NOVOLIN 70/30 U-100 INSULIN) 100 unit/mL (70-30) injection by SubCUTAneous route.  albuterol (PROVENTIL VENTOLIN) 2.5 mg /3 mL (0.083 %) nebu by Nebulization route.  albuterol (PROVENTIL HFA, VENTOLIN HFA, PROAIR HFA) 90 mcg/actuation inhaler Take  by inhalation.  traMADol (ULTRAM) 50 mg tablet Take 50 mg by mouth every six (6) hours as needed for Pain.  CALCIUM PO Take 500 mg by mouth daily.  guaiFENesin (ROBITUSSIN) 100 mg/5 mL liquid Take 200 mg by mouth three (3) times daily as needed for Cough.  HYDROPHILIC CREAM EX by Apply Externally route.  loratadine (CLARITIN) 10 mg tablet Take 10 mg by mouth.       magnesium oxide 420 mg tab Take  by mouth.  losartan (COZAAR) 100 mg tablet Take 100 mg by mouth daily.  mometasone (ASMANEX TWISTHALER) 220 mcg (120 doses) aepb inhaler Take  by inhalation.  cholecalciferol (VITAMIN D3) 1,000 unit cap Take  by mouth daily.  multivitamin (ONE A DAY) tablet Take 1 Tab by mouth daily.  rosuvastatin (CRESTOR) 40 mg tablet Take 40 mg by mouth nightly.  sertraline (ZOLOFT) 100 mg tablet Take  by mouth daily.  gabapentin (NEURONTIN) 100 mg capsule Take  by mouth three (3) times daily.  acetaminophen (TYLENOL) 500 mg tablet Take  by mouth every six (6) hours as needed for Pain.  glucose 4 gram chewable tablet Take 15 g by mouth as needed.  aspirin delayed-release 81 mg tablet Take  by mouth daily.  latanoprost (XALATAN) 0.005 % ophthalmic solution Administer 1 Drop to both eyes nightly.

## 2020-01-24 PROBLEM — I25.10 CAD (CORONARY ARTERY DISEASE): Status: ACTIVE | Noted: 2020-01-24

## 2020-01-28 ENCOUNTER — CLINICAL SUPPORT (OUTPATIENT)
Dept: BARIATRICS/WEIGHT MGMT | Age: 70
End: 2020-01-28

## 2020-01-28 VITALS
BODY MASS INDEX: 44.01 KG/M2 | HEART RATE: 80 BPM | SYSTOLIC BLOOD PRESSURE: 167 MMHG | WEIGHT: 306.7 LBS | DIASTOLIC BLOOD PRESSURE: 77 MMHG

## 2020-01-28 DIAGNOSIS — E11.9 TYPE 2 DIABETES MELLITUS WITHOUT COMPLICATION, WITH LONG-TERM CURRENT USE OF INSULIN (HCC): Primary | ICD-10-CM

## 2020-01-28 DIAGNOSIS — E66.01 MORBID (SEVERE) OBESITY DUE TO EXCESS CALORIES (HCC): ICD-10-CM

## 2020-01-28 DIAGNOSIS — Z79.4 TYPE 2 DIABETES MELLITUS WITHOUT COMPLICATION, WITH LONG-TERM CURRENT USE OF INSULIN (HCC): Primary | ICD-10-CM

## 2020-01-28 DIAGNOSIS — G47.33 OSA TREATED WITH BIPAP: ICD-10-CM

## 2020-01-31 DIAGNOSIS — E66.01 MORBID (SEVERE) OBESITY DUE TO EXCESS CALORIES (HCC): ICD-10-CM

## 2020-01-31 DIAGNOSIS — I10 ESSENTIAL HYPERTENSION: ICD-10-CM

## 2020-01-31 DIAGNOSIS — E11.9 TYPE 2 DIABETES MELLITUS WITHOUT COMPLICATION, WITH LONG-TERM CURRENT USE OF INSULIN (HCC): Primary | ICD-10-CM

## 2020-01-31 DIAGNOSIS — Z79.4 TYPE 2 DIABETES MELLITUS WITHOUT COMPLICATION, WITH LONG-TERM CURRENT USE OF INSULIN (HCC): Primary | ICD-10-CM

## 2020-01-31 DIAGNOSIS — R63.4 RAPID WEIGHT LOSS: ICD-10-CM

## 2020-01-31 NOTE — PROGRESS NOTES
The blood sugar control is pretty good-a1c is 7.8. as it gets better we can start reducing medication but not yet  The kidney and liver are normal  Have blood tests repeated 2-3 days prior to the next visit

## 2020-02-04 ENCOUNTER — CLINICAL SUPPORT (OUTPATIENT)
Dept: BARIATRICS/WEIGHT MGMT | Age: 70
End: 2020-02-04

## 2020-02-04 VITALS
HEART RATE: 90 BPM | SYSTOLIC BLOOD PRESSURE: 133 MMHG | BODY MASS INDEX: 43.16 KG/M2 | DIASTOLIC BLOOD PRESSURE: 72 MMHG | WEIGHT: 300.8 LBS

## 2020-02-04 DIAGNOSIS — G47.33 OSA TREATED WITH BIPAP: ICD-10-CM

## 2020-02-04 DIAGNOSIS — I10 ESSENTIAL HYPERTENSION: ICD-10-CM

## 2020-02-04 DIAGNOSIS — Z79.4 TYPE 2 DIABETES MELLITUS WITHOUT COMPLICATION, WITH LONG-TERM CURRENT USE OF INSULIN (HCC): Primary | ICD-10-CM

## 2020-02-04 DIAGNOSIS — E11.9 TYPE 2 DIABETES MELLITUS WITHOUT COMPLICATION, WITH LONG-TERM CURRENT USE OF INSULIN (HCC): Primary | ICD-10-CM

## 2020-02-04 DIAGNOSIS — E66.01 MORBID (SEVERE) OBESITY DUE TO EXCESS CALORIES (HCC): ICD-10-CM

## 2020-02-06 ENCOUNTER — DOCUMENTATION ONLY (OUTPATIENT)
Dept: FAMILY MEDICINE CLINIC | Age: 70
End: 2020-02-06

## 2020-02-06 ENCOUNTER — TELEPHONE (OUTPATIENT)
Dept: FAMILY MEDICINE CLINIC | Age: 70
End: 2020-02-06

## 2020-02-06 NOTE — TELEPHONE ENCOUNTER
Patient is requesting the following:    Oxygen  needs statement from pcp that he's still using the oxygen so they can submit it to medicare for payment.      LOV: 01/20/2020

## 2020-02-07 NOTE — PROGRESS NOTES
Nurse note from patient's weekly VLCD / LCD / Maintenance class was reviewed. Pertinent medical concerns were:   none     BP Readings from Last 3 Encounters:   02/04/20 133/72   01/28/20 167/77   01/22/20 152/80       Weight Metrics 2/4/2020 1/28/2020 1/22/2020 1/20/2020 1/20/2020 1/16/2020 1/7/2020   Weight 300 lb 12.8 oz 306 lb 11.2 oz 306 lb - 305 lb 3.2 oz 309 lb 12.8 oz 310 lb 3.2 oz   Neck Circ (inches) - - - 17 - - -   Waist Measure Inches - - - 56 - - -   BMI 43.16 kg/m2 44.01 kg/m2 43.91 kg/m2 - 43.79 kg/m2 44.45 kg/m2 44.51 kg/m2       Current Outpatient Medications   Medication Sig Dispense Refill    metFORMIN (GLUCOPHAGE) 500 mg tablet Take 1 Tab by mouth two (2) times daily (with meals). 60 Tab 2    semaglutide (OZEMPIC) 1 mg/dose (2 mg/1.5 mL) sub-q pen 1 mg by SubCUTAneous route every seven (7) days.  bumetanide (BUMEX) 1 mg tablet TAKE 1 TABLET BY MOUTH EVERY DAY 45 Tab 3    Oxygen Indications: 2 liters      oxybutynin chloride XL (DITROPAN XL) 10 mg CR tablet TAKE 1 TABLET BY MOUTH EVERY MORNING  99    tamsulosin (FLOMAX) 0.4 mg capsule TAKE ONE CAPSULE BY MOUTH EVERY MORNING  99    ANORO ELLIPTA 62.5-25 mcg/actuation inhaler TAKE 1 PUFF BY MOUTH EVERY DAY  6    dilTIAZem (TIAZAC) 360 mg ER capsule Take 360 mg by mouth daily.  insulin NPH/insulin regular (NOVOLIN 70/30 U-100 INSULIN) 100 unit/mL (70-30) injection by SubCUTAneous route.  albuterol (PROVENTIL VENTOLIN) 2.5 mg /3 mL (0.083 %) nebu by Nebulization route.  albuterol (PROVENTIL HFA, VENTOLIN HFA, PROAIR HFA) 90 mcg/actuation inhaler Take  by inhalation.  traMADol (ULTRAM) 50 mg tablet Take 50 mg by mouth every six (6) hours as needed for Pain.  CALCIUM PO Take 500 mg by mouth daily.  guaiFENesin (ROBITUSSIN) 100 mg/5 mL liquid Take 200 mg by mouth three (3) times daily as needed for Cough.  HYDROPHILIC CREAM EX by Apply Externally route.       loratadine (CLARITIN) 10 mg tablet Take 10 mg by mouth.      magnesium oxide 420 mg tab Take  by mouth.  losartan (COZAAR) 100 mg tablet Take 100 mg by mouth daily.  mometasone (ASMANEX TWISTHALER) 220 mcg (120 doses) aepb inhaler Take  by inhalation.  cholecalciferol (VITAMIN D3) 1,000 unit cap Take  by mouth daily.  multivitamin (ONE A DAY) tablet Take 1 Tab by mouth daily.  rosuvastatin (CRESTOR) 40 mg tablet Take 40 mg by mouth nightly.  sertraline (ZOLOFT) 100 mg tablet Take  by mouth daily.  gabapentin (NEURONTIN) 100 mg capsule Take  by mouth two (2) times a day.  acetaminophen (TYLENOL) 500 mg tablet Take  by mouth every six (6) hours as needed for Pain.  glucose 4 gram chewable tablet Take 15 g by mouth as needed.  aspirin delayed-release 81 mg tablet Take  by mouth daily.  latanoprost (XALATAN) 0.005 % ophthalmic solution Administer 1 Drop to both eyes nightly.

## 2020-02-11 ENCOUNTER — CLINICAL SUPPORT (OUTPATIENT)
Dept: BARIATRICS/WEIGHT MGMT | Age: 70
End: 2020-02-11

## 2020-02-11 VITALS
HEART RATE: 97 BPM | WEIGHT: 297.5 LBS | SYSTOLIC BLOOD PRESSURE: 152 MMHG | DIASTOLIC BLOOD PRESSURE: 78 MMHG | BODY MASS INDEX: 42.69 KG/M2

## 2020-02-11 DIAGNOSIS — G47.33 OSA TREATED WITH BIPAP: ICD-10-CM

## 2020-02-11 DIAGNOSIS — I10 ESSENTIAL HYPERTENSION: ICD-10-CM

## 2020-02-11 DIAGNOSIS — E11.9 TYPE 2 DIABETES MELLITUS WITHOUT COMPLICATION, WITH LONG-TERM CURRENT USE OF INSULIN (HCC): Primary | ICD-10-CM

## 2020-02-11 DIAGNOSIS — E66.01 MORBID (SEVERE) OBESITY DUE TO EXCESS CALORIES (HCC): ICD-10-CM

## 2020-02-11 DIAGNOSIS — Z79.4 TYPE 2 DIABETES MELLITUS WITHOUT COMPLICATION, WITH LONG-TERM CURRENT USE OF INSULIN (HCC): Primary | ICD-10-CM

## 2020-02-15 NOTE — PROGRESS NOTES
Nurse note from patient's weekly VLCD / LCD / Maintenance class was reviewed. Pertinent medical concerns were:   none     BP Readings from Last 3 Encounters:   02/11/20 152/78   02/04/20 133/72   01/28/20 167/77       Weight Metrics 2/11/2020 2/4/2020 1/28/2020 1/22/2020 1/20/2020 1/20/2020 1/16/2020   Weight 297 lb 8 oz 300 lb 12.8 oz 306 lb 11.2 oz 306 lb - 305 lb 3.2 oz 309 lb 12.8 oz   Neck Circ (inches) - - - - 17 - -   Waist Measure Inches - - - - 56 - -   BMI 42.69 kg/m2 43.16 kg/m2 44.01 kg/m2 43.91 kg/m2 - 43.79 kg/m2 44.45 kg/m2       Current Outpatient Medications   Medication Sig Dispense Refill    metFORMIN (GLUCOPHAGE) 500 mg tablet Take 1 Tab by mouth two (2) times daily (with meals). 60 Tab 2    semaglutide (OZEMPIC) 1 mg/dose (2 mg/1.5 mL) sub-q pen 1 mg by SubCUTAneous route every seven (7) days.  bumetanide (BUMEX) 1 mg tablet TAKE 1 TABLET BY MOUTH EVERY DAY 45 Tab 3    Oxygen Indications: 2 liters      oxybutynin chloride XL (DITROPAN XL) 10 mg CR tablet TAKE 1 TABLET BY MOUTH EVERY MORNING  99    tamsulosin (FLOMAX) 0.4 mg capsule TAKE ONE CAPSULE BY MOUTH EVERY MORNING  99    ANORO ELLIPTA 62.5-25 mcg/actuation inhaler TAKE 1 PUFF BY MOUTH EVERY DAY  6    dilTIAZem (TIAZAC) 360 mg ER capsule Take 360 mg by mouth daily.  insulin NPH/insulin regular (NOVOLIN 70/30 U-100 INSULIN) 100 unit/mL (70-30) injection by SubCUTAneous route.  albuterol (PROVENTIL VENTOLIN) 2.5 mg /3 mL (0.083 %) nebu by Nebulization route.  albuterol (PROVENTIL HFA, VENTOLIN HFA, PROAIR HFA) 90 mcg/actuation inhaler Take  by inhalation.  traMADol (ULTRAM) 50 mg tablet Take 50 mg by mouth every six (6) hours as needed for Pain.  CALCIUM PO Take 500 mg by mouth daily.  guaiFENesin (ROBITUSSIN) 100 mg/5 mL liquid Take 200 mg by mouth three (3) times daily as needed for Cough.  HYDROPHILIC CREAM EX by Apply Externally route.       loratadine (CLARITIN) 10 mg tablet Take 10 mg by mouth.      magnesium oxide 420 mg tab Take  by mouth.  losartan (COZAAR) 100 mg tablet Take 100 mg by mouth daily.  mometasone (ASMANEX TWISTHALER) 220 mcg (120 doses) aepb inhaler Take  by inhalation.  cholecalciferol (VITAMIN D3) 1,000 unit cap Take  by mouth daily.  multivitamin (ONE A DAY) tablet Take 1 Tab by mouth daily.  rosuvastatin (CRESTOR) 40 mg tablet Take 40 mg by mouth nightly.  sertraline (ZOLOFT) 100 mg tablet Take  by mouth daily.  gabapentin (NEURONTIN) 100 mg capsule Take  by mouth two (2) times a day.  acetaminophen (TYLENOL) 500 mg tablet Take  by mouth every six (6) hours as needed for Pain.  glucose 4 gram chewable tablet Take 15 g by mouth as needed.  aspirin delayed-release 81 mg tablet Take  by mouth daily.  latanoprost (XALATAN) 0.005 % ophthalmic solution Administer 1 Drop to both eyes nightly.

## 2020-02-17 ENCOUNTER — OFFICE VISIT (OUTPATIENT)
Dept: FAMILY MEDICINE CLINIC | Age: 70
End: 2020-02-17

## 2020-02-17 VITALS
DIASTOLIC BLOOD PRESSURE: 71 MMHG | HEIGHT: 70 IN | HEART RATE: 76 BPM | BODY MASS INDEX: 42.22 KG/M2 | WEIGHT: 294.9 LBS | SYSTOLIC BLOOD PRESSURE: 150 MMHG | TEMPERATURE: 98.1 F | RESPIRATION RATE: 18 BRPM

## 2020-02-17 DIAGNOSIS — G47.33 OSA TREATED WITH BIPAP: ICD-10-CM

## 2020-02-17 DIAGNOSIS — E66.01 MORBID (SEVERE) OBESITY DUE TO EXCESS CALORIES (HCC): ICD-10-CM

## 2020-02-17 DIAGNOSIS — I10 ESSENTIAL HYPERTENSION: ICD-10-CM

## 2020-02-17 DIAGNOSIS — E11.9 TYPE 2 DIABETES MELLITUS WITHOUT COMPLICATION, WITH LONG-TERM CURRENT USE OF INSULIN (HCC): Primary | ICD-10-CM

## 2020-02-17 DIAGNOSIS — Z79.4 TYPE 2 DIABETES MELLITUS WITHOUT COMPLICATION, WITH LONG-TERM CURRENT USE OF INSULIN (HCC): Primary | ICD-10-CM

## 2020-02-17 DIAGNOSIS — R63.4 RAPID WEIGHT LOSS: ICD-10-CM

## 2020-02-17 RX ORDER — BISMUTH SUBSALICYLATE 262 MG
1 TABLET,CHEWABLE ORAL DAILY
Qty: 90 TAB | Refills: 2 | Status: SHIPPED | OUTPATIENT
Start: 2020-02-17 | End: 2020-12-19

## 2020-02-17 NOTE — PROGRESS NOTES
Alfredo Nolan is a 78 y.o. male      Chief Complaint   Patient presents with    Weight Management     1. Have you been to the ER, urgent care clinic since your last visit? Hospitalized since your last visit? No    2. Have you seen or consulted any other health care providers outside of the [address] [place] [address] since your last visit? Include any pap smears or colon screening.  No       Body Weight: 294.9lb    Body Fat:  37.2%    BMI: 41.0    Water %:  43.7r

## 2020-02-17 NOTE — PROGRESS NOTES
[facility] Progress Note:   F/up Physician Visit    CC: Weight Management      Jose Bryant is a 78 y.o. male who is here for his  f/up physician visit for the VLCD / LCD Program.    Weight Metrics 2/17/2020 2/17/2020 2/11/2020 2/4/2020 1/28/2020 1/22/2020 1/20/2020   Weight - 294 lb 14.4 oz 297 lb 8 oz 300 lb 12.8 oz 306 lb 11.2 oz 306 lb -   Neck Circ (inches) 17.5 - - - - - 17   Waist Measure Inches 54.5 - - - - - 56   BMI - 42.31 kg/m2 42.69 kg/m2 43.16 kg/m2 44.01 kg/m2 43.91 kg/m2 -   bp at home in 130s  Needs MI 38 so he can get right knee replaced  Has 3 replacements and some vegetables in the pm    Blood sugar highest in 170s        Outpatient Medications Marked as Taking for the 2/17/20 encounter (Office Visit) with Lucien Gooden MD   Medication Sig Dispense Refill    multivitamin (ONE A DAY) tablet Take 1 Tab by mouth daily. 90 Tab 2    metFORMIN (GLUCOPHAGE) 500 mg tablet Take 1 Tab by mouth two (2) times daily (with meals). 60 Tab 2    semaglutide (OZEMPIC) 1 mg/dose (2 mg/1.5 mL) sub-q pen 1 mg by SubCUTAneous route every seven (7) days.  bumetanide (BUMEX) 1 mg tablet TAKE 1 TABLET BY MOUTH EVERY DAY 45 Tab 3    Oxygen Indications: 2 liters      oxybutynin chloride XL (DITROPAN XL) 10 mg CR tablet TAKE 1 TABLET BY MOUTH EVERY MORNING  99    tamsulosin (FLOMAX) 0.4 mg capsule TAKE ONE CAPSULE BY MOUTH EVERY MORNING  99    ANORO ELLIPTA 62.5-25 mcg/actuation inhaler TAKE 1 PUFF BY MOUTH EVERY DAY  6    dilTIAZem (TIAZAC) 360 mg ER capsule Take 360 mg by mouth daily.  albuterol (PROVENTIL VENTOLIN) 2.5 mg /3 mL (0.083 %) nebu by Nebulization route.  albuterol (PROVENTIL HFA, VENTOLIN HFA, PROAIR HFA) 90 mcg/actuation inhaler Take  by inhalation.  CALCIUM PO Take 500 mg by mouth daily.  loratadine (CLARITIN) 10 mg tablet Take 10 mg by mouth.  magnesium oxide 420 mg tab Take  by mouth.       losartan (COZAAR) 100 mg tablet Take 100 mg by mouth daily.      mometasone (ASMANEX TWISTHALER) 220 mcg (120 doses) aepb inhaler Take  by inhalation.  cholecalciferol (VITAMIN D3) 1,000 unit cap Take  by mouth daily.  rosuvastatin (CRESTOR) 40 mg tablet Take 40 mg by mouth nightly.  sertraline (ZOLOFT) 100 mg tablet Take  by mouth daily.  gabapentin (NEURONTIN) 100 mg capsule Take  by mouth two (2) times a day.  acetaminophen (TYLENOL) 500 mg tablet Take  by mouth every six (6) hours as needed for Pain.  glucose 4 gram chewable tablet Take 15 g by mouth as needed.  aspirin delayed-release 81 mg tablet Take  by mouth daily.  latanoprost (XALATAN) 0.005 % ophthalmic solution Administer 1 Drop to both eyes nightly. Participation   Did you attend clinic and class last week? yes    Review of Systems  Since your last visit, have you experienced any complications? no  If yes, please list:       Are you taking an appetite suppressant? no  If so, is there any Chest Pain, Palpitations or Dizziness? BP Readings from Last 3 Encounters:   02/17/20 150/71   02/11/20 152/78   02/04/20 133/72       SLEEP: 8    Have you received any other medical care this week? no  If yes, where and for what? Have you discontinued or changed any medicine or dose of your medicine since your last visit with Dr Jeanene Lennox? no  If yes, where and for what? Diet  How many ounces of calorie-free liquids did you consume each day?  40 oz    How many meal replacements did you take each day? 3    Did you have any problems adhering to the program?  no If yes, please explain:      Exercise  Aerobic exercise: 25 min  Resistance exercise: 6 workouts / week  Any discomfort?  no     If yes, where?       Objective  Visit Vitals  /71 (BP 1 Location: Right arm, BP Patient Position: Sitting)   Pulse 76   Temp 98.1 °F (36.7 °C) (Oral)   Resp 18   Ht 5' 10\" (1.778 m)   Wt 294 lb 14.4 oz (133.8 kg)   BMI 42.31 kg/m²     No LMP for male patient. Physical Exam  Appearance: well,   Mental:A&O x 3, NAD  H:NC/AT,  EENT:   EOMI, PERRL, No scleral icterus  Neck: no bruit or JVD  Lung: clear, No W/R  ABD: soft, active, nontender  Ext:  no Edema  Neuro: nonfocal  Assessment / Plan    Encounter Diagnoses   Name Primary?  Type 2 diabetes mellitus without complication, with long-term current use of insulin (HCC) Yes    Morbid (severe) obesity due to excess calories ([address]     Essential hypertension     JASWINDER treated with BiPAP     Rapid weight loss      Diagnoses and all orders for this visit:    1. Type 2 diabetes mellitus without complication, with long-term current use of insulin ([address]  January the a1c was 7.8, which is at the minimal goal. I want to see it below 7  2. Morbid (severe) obesity due to excess calories ([address]  Started at 332 and now 294, lost 38 lbs so far  3. Essential hypertension   bp not great today. 4. JASWINDER treated with BiPAP    5. Rapid weight loss  In 6 months has lost almost 40 lbs. Doing well especially with his mobility problems  Other orders  -     multivitamin (ONE A DAY) tablet; Take 1 Tab by mouth daily. 1.  Weight management improved   Progress was reviewed with patient    2. Labs    Latest results reviewed with patient   Lab slip given to pt for f/up  labs      Over 20 minutes of the 30 minutes face to face time with Monique Pate consisted of counseling & coordinating and/or discussing treatment plans in reference to his obesity The primary encounter diagnosis was Type 2 diabetes mellitus without complication, with long-term current use of insulin ([address] Diagnoses of Morbid (severe) obesity due to excess calories ([address] Essential hypertension, JASWINDER treated with BiPAP, and Rapid weight loss were also pertinent to this visit.

## 2020-02-25 ENCOUNTER — CLINICAL SUPPORT (OUTPATIENT)
Dept: BARIATRICS/WEIGHT MGMT | Age: 70
End: 2020-02-25

## 2020-02-25 VITALS
HEART RATE: 91 BPM | BODY MASS INDEX: 41.55 KG/M2 | SYSTOLIC BLOOD PRESSURE: 101 MMHG | WEIGHT: 289.6 LBS | DIASTOLIC BLOOD PRESSURE: 59 MMHG

## 2020-02-25 DIAGNOSIS — E11.9 TYPE 2 DIABETES MELLITUS WITHOUT COMPLICATION, WITH LONG-TERM CURRENT USE OF INSULIN (HCC): Primary | ICD-10-CM

## 2020-02-25 DIAGNOSIS — I10 ESSENTIAL HYPERTENSION: ICD-10-CM

## 2020-02-25 DIAGNOSIS — G47.33 OSA TREATED WITH BIPAP: ICD-10-CM

## 2020-02-25 DIAGNOSIS — Z79.4 TYPE 2 DIABETES MELLITUS WITHOUT COMPLICATION, WITH LONG-TERM CURRENT USE OF INSULIN (HCC): Primary | ICD-10-CM

## 2020-02-25 DIAGNOSIS — E66.01 MORBID (SEVERE) OBESITY DUE TO EXCESS CALORIES (HCC): ICD-10-CM

## 2020-03-04 DIAGNOSIS — Z79.4 TYPE 2 DIABETES MELLITUS WITHOUT COMPLICATION, WITH LONG-TERM CURRENT USE OF INSULIN (HCC): ICD-10-CM

## 2020-03-04 DIAGNOSIS — E11.9 TYPE 2 DIABETES MELLITUS WITHOUT COMPLICATION, WITH LONG-TERM CURRENT USE OF INSULIN (HCC): ICD-10-CM

## 2020-03-05 ENCOUNTER — CLINICAL SUPPORT (OUTPATIENT)
Dept: BARIATRICS/WEIGHT MGMT | Age: 70
End: 2020-03-05

## 2020-03-05 VITALS
SYSTOLIC BLOOD PRESSURE: 152 MMHG | WEIGHT: 288.5 LBS | HEIGHT: 70 IN | DIASTOLIC BLOOD PRESSURE: 72 MMHG | BODY MASS INDEX: 41.3 KG/M2

## 2020-03-05 DIAGNOSIS — Z79.4 TYPE 2 DIABETES MELLITUS WITHOUT COMPLICATION, WITH LONG-TERM CURRENT USE OF INSULIN (HCC): Primary | ICD-10-CM

## 2020-03-05 DIAGNOSIS — I10 ESSENTIAL HYPERTENSION: ICD-10-CM

## 2020-03-05 DIAGNOSIS — E66.01 MORBID (SEVERE) OBESITY DUE TO EXCESS CALORIES (HCC): ICD-10-CM

## 2020-03-05 DIAGNOSIS — E11.9 TYPE 2 DIABETES MELLITUS WITHOUT COMPLICATION, WITH LONG-TERM CURRENT USE OF INSULIN (HCC): Primary | ICD-10-CM

## 2020-03-05 DIAGNOSIS — G47.33 OSA TREATED WITH BIPAP: ICD-10-CM

## 2020-03-05 NOTE — PROGRESS NOTES
Assessment/Plan:    4year old brought in by mother for refill of ADHD medication and will restart with Methylphenidate 10 mg ER in AM and 5mg IR in PM   Mother reports patient in past requiring extra dose in afternoon as effects of long acting dose wears off by the time she gets home from school  D/W Dr Makenna Cabrera  Patient to follow up in 2-3 weeks to evaluate effectiveness and is tolerating  Diagnoses and all orders for this visit:    Attention deficit hyperactivity disorder (ADHD), combined type  -     methylphenidate (METADATE ER) 10 MG ER tablet; Take 1 tablet (10 mg total) by mouth every morning Max Daily Amount: 10 mg  -     methylphenidate (RITALIN) 5 mg tablet; Take once daily in afternoon    Need for influenza vaccination  -     SYRINGE/SINGLE-DOSE VIAL: influenza vaccine, [id number], quadrivalent, 0 5 mL, preservative-free, for patients 3+ yr (FLUZONE)          Subjective:      Patient ID: Elvis Sexton is a 4 y o  female  HPI  4year old with PMH of ADHD on Concerta and comes in for refill of ADHD medication  Patient has not been seen since 6/22/18 and had summer break  Patient restarted school but unable to get visit for refill due to insurance renewal issues  No weight loss, good appetite  Patient has has been having some decline in academic performance since discontinued medication with problems focus and hyperactivity  The following portions of the patient's history were reviewed and updated as appropriate: allergies, current medications, past family history, past medical history, past social history, past surgical history and problem list     Review of Systems   Constitutional: Negative for chills, fever and unexpected weight change  Respiratory: Negative for shortness of breath  Cardiovascular: Negative for chest pain  Psychiatric/Behavioral: Positive for decreased concentration  Negative for sleep disturbance           Objective:      BP (!) 102/52   Pulse 82   Temp 98 See scanned homework °F (36 7 °C)   Resp 18   Ht 4' 8 5" (1 435 m)   Wt 36 3 kg (80 lb)   SpO2 98%   BMI 17 62 kg/m²          Physical Exam   Constitutional: She is active  HENT:   Mouth/Throat: Mucous membranes are dry  Eyes: Pupils are equal, round, and reactive to light  EOM are normal    Pulmonary/Chest: Effort normal and breath sounds normal    Musculoskeletal: Normal range of motion  Neurological: She is alert  Skin: Skin is warm and dry

## 2020-03-05 NOTE — PROGRESS NOTES
Nurse note from patient's weekly VLCD / LCD / Maintenance class was reviewed. Pertinent medical concerns were:   none     BP Readings from Last 3 Encounters:   02/25/20 101/59   02/17/20 150/71   02/11/20 152/78       Weight Metrics 2/25/2020 2/17/2020 2/17/2020 2/11/2020 2/4/2020 1/28/2020 1/22/2020   Weight 289 lb 9.6 oz - 294 lb 14.4 oz 297 lb 8 oz 300 lb 12.8 oz 306 lb 11.2 oz 306 lb   Neck Circ (inches) - 17.5 - - - - -   Waist Measure Inches - 54.5 - - - - -   BMI 41.55 kg/m2 - 42.31 kg/m2 42.69 kg/m2 43.16 kg/m2 44.01 kg/m2 43.91 kg/m2       Current Outpatient Medications   Medication Sig Dispense Refill    multivitamin (ONE A DAY) tablet Take 1 Tab by mouth daily. 90 Tab 2    metFORMIN (GLUCOPHAGE) 500 mg tablet Take 1 Tab by mouth two (2) times daily (with meals). 60 Tab 2    semaglutide (OZEMPIC) 1 mg/dose (2 mg/1.5 mL) sub-q pen 1 mg by SubCUTAneous route every seven (7) days.  bumetanide (BUMEX) 1 mg tablet TAKE 1 TABLET BY MOUTH EVERY DAY 45 Tab 3    Oxygen Indications: 2 liters      oxybutynin chloride XL (DITROPAN XL) 10 mg CR tablet TAKE 1 TABLET BY MOUTH EVERY MORNING  99    tamsulosin (FLOMAX) 0.4 mg capsule TAKE ONE CAPSULE BY MOUTH EVERY MORNING  99    ANORO ELLIPTA 62.5-25 mcg/actuation inhaler TAKE 1 PUFF BY MOUTH EVERY DAY  6    dilTIAZem (TIAZAC) 360 mg ER capsule Take 360 mg by mouth daily.  albuterol (PROVENTIL VENTOLIN) 2.5 mg /3 mL (0.083 %) nebu by Nebulization route.  albuterol (PROVENTIL HFA, VENTOLIN HFA, PROAIR HFA) 90 mcg/actuation inhaler Take  by inhalation.  CALCIUM PO Take 500 mg by mouth daily.  loratadine (CLARITIN) 10 mg tablet Take 10 mg by mouth.  magnesium oxide 420 mg tab Take  by mouth.  losartan (COZAAR) 100 mg tablet Take 100 mg by mouth daily.  mometasone (ASMANEX TWISTHALER) 220 mcg (120 doses) aepb inhaler Take  by inhalation.  cholecalciferol (VITAMIN D3) 1,000 unit cap Take  by mouth daily.       rosuvastatin (CRESTOR) 40 mg tablet Take 40 mg by mouth nightly.  sertraline (ZOLOFT) 100 mg tablet Take  by mouth daily.  gabapentin (NEURONTIN) 100 mg capsule Take  by mouth two (2) times a day.  acetaminophen (TYLENOL) 500 mg tablet Take  by mouth every six (6) hours as needed for Pain.  glucose 4 gram chewable tablet Take 15 g by mouth as needed.  aspirin delayed-release 81 mg tablet Take  by mouth daily.  latanoprost (XALATAN) 0.005 % ophthalmic solution Administer 1 Drop to both eyes nightly.

## 2020-03-07 RX ORDER — METFORMIN HYDROCHLORIDE 500 MG/1
TABLET ORAL
Qty: 180 TAB | Refills: 0 | Status: SHIPPED | OUTPATIENT
Start: 2020-03-07 | End: 2020-06-09

## 2020-03-09 NOTE — PROGRESS NOTES
Nurse note from patient's weekly VLCD / LCD / Maintenance class was reviewed. Pertinent medical concerns were:   NONE     BP Readings from Last 3 Encounters:   03/05/20 152/72   02/25/20 101/59   02/17/20 150/71       Weight Metrics 3/5/2020 2/25/2020 2/17/2020 2/17/2020 2/11/2020 2/4/2020 1/28/2020   Weight 288 lb 8 oz 289 lb 9.6 oz - 294 lb 14.4 oz 297 lb 8 oz 300 lb 12.8 oz 306 lb 11.2 oz   Neck Circ (inches) - - 17.5 - - - -   Waist Measure Inches - - 54.5 - - - -   BMI 41.4 kg/m2 41.55 kg/m2 - 42.31 kg/m2 42.69 kg/m2 43.16 kg/m2 44.01 kg/m2       Current Outpatient Medications   Medication Sig Dispense Refill    metFORMIN (GLUCOPHAGE) 500 mg tablet TAKE 1 TABLET BY MOUTH TWICE A DAY WITH MEALS 180 Tab 0    multivitamin (ONE A DAY) tablet Take 1 Tab by mouth daily. 90 Tab 2    semaglutide (OZEMPIC) 1 mg/dose (2 mg/1.5 mL) sub-q pen 1 mg by SubCUTAneous route every seven (7) days.  bumetanide (BUMEX) 1 mg tablet TAKE 1 TABLET BY MOUTH EVERY DAY 45 Tab 3    Oxygen Indications: 2 liters      oxybutynin chloride XL (DITROPAN XL) 10 mg CR tablet TAKE 1 TABLET BY MOUTH EVERY MORNING  99    tamsulosin (FLOMAX) 0.4 mg capsule TAKE ONE CAPSULE BY MOUTH EVERY MORNING  99    ANORO ELLIPTA 62.5-25 mcg/actuation inhaler TAKE 1 PUFF BY MOUTH EVERY DAY  6    dilTIAZem (TIAZAC) 360 mg ER capsule Take 360 mg by mouth daily.  albuterol (PROVENTIL VENTOLIN) 2.5 mg /3 mL (0.083 %) nebu by Nebulization route.  albuterol (PROVENTIL HFA, VENTOLIN HFA, PROAIR HFA) 90 mcg/actuation inhaler Take  by inhalation.  CALCIUM PO Take 500 mg by mouth daily.  loratadine (CLARITIN) 10 mg tablet Take 10 mg by mouth.  magnesium oxide 420 mg tab Take  by mouth.  losartan (COZAAR) 100 mg tablet Take 100 mg by mouth daily.  mometasone (ASMANEX TWISTHALER) 220 mcg (120 doses) aepb inhaler Take  by inhalation.  cholecalciferol (VITAMIN D3) 1,000 unit cap Take  by mouth daily.       rosuvastatin (CRESTOR) 40 mg tablet Take 40 mg by mouth nightly.  sertraline (ZOLOFT) 100 mg tablet Take  by mouth daily.  gabapentin (NEURONTIN) 100 mg capsule Take  by mouth two (2) times a day.  acetaminophen (TYLENOL) 500 mg tablet Take  by mouth every six (6) hours as needed for Pain.  glucose 4 gram chewable tablet Take 15 g by mouth as needed.  aspirin delayed-release 81 mg tablet Take  by mouth daily.  latanoprost (XALATAN) 0.005 % ophthalmic solution Administer 1 Drop to both eyes nightly.

## 2020-04-20 ENCOUNTER — VIRTUAL VISIT (OUTPATIENT)
Dept: CARDIOLOGY CLINIC | Age: 70
End: 2020-04-20

## 2020-04-20 DIAGNOSIS — I10 ESSENTIAL HYPERTENSION: ICD-10-CM

## 2020-04-20 DIAGNOSIS — E66.01 MORBID (SEVERE) OBESITY DUE TO EXCESS CALORIES (HCC): ICD-10-CM

## 2020-04-20 DIAGNOSIS — I45.2 RBBB (RIGHT BUNDLE BRANCH BLOCK WITH LEFT ANTERIOR FASCICULAR BLOCK): ICD-10-CM

## 2020-04-20 DIAGNOSIS — Z79.4 TYPE 2 DIABETES MELLITUS WITHOUT COMPLICATION, WITH LONG-TERM CURRENT USE OF INSULIN (HCC): ICD-10-CM

## 2020-04-20 DIAGNOSIS — E11.9 TYPE 2 DIABETES MELLITUS WITHOUT COMPLICATION, WITH LONG-TERM CURRENT USE OF INSULIN (HCC): ICD-10-CM

## 2020-04-20 DIAGNOSIS — I25.10 CORONARY ARTERY DISEASE INVOLVING NATIVE CORONARY ARTERY OF NATIVE HEART WITHOUT ANGINA PECTORIS: Primary | ICD-10-CM

## 2020-04-20 DIAGNOSIS — I50.32 CHRONIC HEART FAILURE WITH PRESERVED EJECTION FRACTION (HCC): ICD-10-CM

## 2020-04-20 NOTE — PROGRESS NOTES
VIRTUAL VISIT DOCUMENTATION     Pursuant to the emergency declaration under the [facility] waiver authority and the [employer] Act, this Virtual  Visit was conducted, with patient's consent, to reduce the patient's risk of exposure to COVID-19 and provide continuity of care for an established patient. Services were provided through a video synchronous discussion virtually to substitute for in-person clinic visit. CHIEF COMPLAINT      Amara Silvestre is a 69 y.o. male who was seen by synchronous (real-time) audio-video technology on 4/20/2020. Patient is being seen today for followup of HFpEF, HTN     ASSESSMENT        ICD-10-CM ICD-9-CM    1. Coronary artery disease involving native coronary artery of native heart without angina pectoris I25.10 414.01    2. RBBB (right bundle branch block with left anterior fascicular block) I45.2 426.52    3. Essential hypertension I10 401.9    4. Type 2 diabetes mellitus without complication, with long-term current use of insulin ([facility]) E11.9 250.00     Z79.4 V58.67    5. Morbid (severe) obesity due to excess calories ([facility]) E66.01 278.01    6. Chronic heart failure with preserved ejection fraction (HCC) I50.32 428.9         PLAN     Chronic HFpEF, class 2. Substrate=HTN, JASWINDER, DM, morbid obesity. Volume status seems fine. BP normal.   - continue bumex 1 mg/d  - continue weight loss strategy  - reassess EF with echo in 4 months    Mild CAD by cath  - continue aspirin  - aggressive RF modification    HTN, controlled on combination Rx  - continue home BP monitoring    JASWINDER on biPAP    T2DM. Recent A1c 7.8 but interval weight loss noted. On MET + GLP1a. Managed by Dr Amy Rojas    Morbid obesity. Weight down to 284    Overall seems to be faring reasonably well during pandemic.  Has good community support    RTC 4 months  We discussed the expected course, resolution and complications of the diagnosis(es) in detail. Medication risks, benefits, costs, interactions, and alternatives were discussed as indicated. I advised him to contact the office if his condition worsens, changes or fails to improve as anticipated. He expressed understanding with the diagnosis(es) and plan    HISTORY OF PRESENTING ILLNESS      Unknown Brionna is a 69 y.o. male   Came off oxygen 3 weeks ago  Feels good  No significant HUITRON but fairly sedentary lifestyle  Sheltering in place - good family/community support thru virtual tech  Weight down to 284 lbs  Adherent with biPAP  Off insulin    Patient denies any exertional chest pain, palpitations, syncope, orthopnea, edema or paroxysmal nocturnal dyspnea. ACTIVE PROBLEM LIST     Patient Active Problem List    Diagnosis Date Noted    CAD (coronary artery disease) 01/24/2020    RBBB (right bundle branch block with left anterior fascicular block) 07/23/2019    JASWINDER treated with BiPAP 07/23/2019    Essential hypertension 07/23/2019    Type 2 diabetes mellitus without complication, with long-term current use of insulin ([address] 07/23/2019    Bronchospastic airway disease 07/23/2019    Morbid (severe) obesity due to excess calories ([address] 07/22/2019           PAST MEDICAL HISTORY     Past Medical History:   Diagnosis Date    Depression     Diabetes ([address]     Hypercholesterolemia     Hypertension     PTSD (post-traumatic stress disorder)     PTSD (post-traumatic stress disorder)            PAST SURGICAL HISTORY     Past Surgical History:   Procedure Laterality Date    HX ORTHOPAEDIC      L/R arthroscopic          ALLERGIES     No Known Allergies       FAMILY HISTORY     History reviewed. No pertinent family history.  negative for cardiac disease       SOCIAL HISTORY     Social History     Socioeconomic History    Marital status:      Spouse name: Not on file    Number of children: Not on file    Years of education: Not on file    Highest education level: Not on file   Tobacco Use    Smoking status: Former Smoker    Smokeless tobacco: Never Used   Substance and Sexual Activity    Alcohol use: Yes     Comment: occasionally    Drug use: Never         MEDICATIONS     Current Outpatient Medications   Medication Sig    metFORMIN (GLUCOPHAGE) 500 mg tablet TAKE 1 TABLET BY MOUTH TWICE A DAY WITH MEALS    multivitamin (ONE A DAY) tablet Take 1 Tab by mouth daily.  semaglutide (OZEMPIC) 1 mg/dose (2 mg/1.5 mL) sub-q pen 1 mg by SubCUTAneous route every seven (7) days.  bumetanide (BUMEX) 1 mg tablet TAKE 1 TABLET BY MOUTH EVERY DAY    Oxygen Indications: 2 liters    oxybutynin chloride XL (DITROPAN XL) 10 mg CR tablet TAKE 1 TABLET BY MOUTH EVERY MORNING    tamsulosin (FLOMAX) 0.4 mg capsule TAKE ONE CAPSULE BY MOUTH EVERY MORNING    dilTIAZem (TIAZAC) 360 mg ER capsule Take 360 mg by mouth daily.  albuterol (PROVENTIL VENTOLIN) 2.5 mg /3 mL (0.083 %) nebu by Nebulization route.  albuterol (PROVENTIL HFA, VENTOLIN HFA, PROAIR HFA) 90 mcg/actuation inhaler Take  by inhalation.  CALCIUM PO Take 500 mg by mouth daily.  loratadine (CLARITIN) 10 mg tablet Take 10 mg by mouth.  magnesium oxide 420 mg tab Take  by mouth.  losartan (COZAAR) 100 mg tablet Take 100 mg by mouth daily.  mometasone (ASMANEX TWISTHALER) 220 mcg (120 doses) aepb inhaler Take  by inhalation.  cholecalciferol (VITAMIN D3) 1,000 unit cap Take  by mouth daily.  rosuvastatin (CRESTOR) 40 mg tablet Take 40 mg by mouth nightly.  sertraline (ZOLOFT) 100 mg tablet Take  by mouth daily.  gabapentin (NEURONTIN) 100 mg capsule Take  by mouth two (2) times a day.  acetaminophen (TYLENOL) 500 mg tablet Take  by mouth every six (6) hours as needed for Pain.  glucose 4 gram chewable tablet Take 15 g by mouth as needed.  aspirin delayed-release 81 mg tablet Take  by mouth daily.  latanoprost (XALATAN) 0.005 % ophthalmic solution Administer 1 Drop to both eyes nightly.  ANORO ELLIPTA 62.5-25 mcg/actuation inhaler TAKE 1 PUFF BY MOUTH EVERY DAY     No current facility-administered medications for this visit. I have reviewed the nurses notes, vitals, problem list, allergy list, medical history, family, social history and medications. REVIEW OF SYMPTOMS     Constitutional: Negative for fever, chills, malaise/fatigue and diaphoresis. Respiratory: Negative for cough, hemoptysis, sputum production, shortness of breath and wheezing. Cardiovascular: Negative for chest pain, palpitations, orthopnea, claudication, leg swelling and PND. Gastrointestinal: Negative for heartburn, nausea, vomiting, blood in stool and melena. Genitourinary: Negative for dysuria and flank pain. Musculoskeletal: Negative for joint pain and back pain. Skin: Negative for rash. Neurological: Negative for focal weakness, seizures, loss of consciousness, weakness and headaches. Endo/Heme/Allergies: Negative for abnormal bleeding. Psychiatric/Behavioral: Negative for memory loss. PHYSICAL EXAMINATION      Due to this being a TeleHealth evaluation, many elements of the physical examination are unable to be assessed. General: Well developed, in no acute distress, cooperative and alert  HEENT: Pupils equal/round. No marked JVD visible on video. Respiratory: No audible wheezing, no signs of respiratory distress, lips non cyanotic  Extremities:  No edema  Neuro: A&Ox3, speech clear, no facial droop, answering questions appropriately  Skin: Skin color is normal. No rashes or lesions. Non diaphoretic on visible skin during exam       DIAGNOSTIC DATA      Echo 3/7/19 - EF 45-50%, mild LVH    Lexiscan Cardiolite 9/6/19 - equivocal perfusion study. Fixed inferior defect likely due to attenuation artifact but infarction cannot be excluded. Cath 10/8/19 - mild CAD. Mildly elevated LVEDP; 14.        LABORATORY DATA      Lab Results   Component Value Date/Time    WBC 9.5 10/01/2019 02:43 PM Hemoglobin (POC) 14.3 07/14/2010 01:32 PM    HGB 13.6 10/01/2019 02:43 PM    Hematocrit (POC) 42 07/14/2010 01:32 PM    HCT 41.7 10/01/2019 02:43 PM    PLATELET 098 90/75/8354 02:43 PM    MCV 81 10/01/2019 02:43 PM      Lab Results   Component Value Date/Time    Sodium 140 01/20/2020 12:07 PM    Potassium 3.6 01/20/2020 12:07 PM    Chloride 102 01/20/2020 12:07 PM    CO2 32 01/20/2020 12:07 PM    Anion gap 6 01/20/2020 12:07 PM    Glucose 175 (H) 01/20/2020 12:07 PM    BUN 15 01/20/2020 12:07 PM    Creatinine 1.09 01/20/2020 12:07 PM    BUN/Creatinine ratio 14 01/20/2020 12:07 PM    GFR est AA >60 01/20/2020 12:07 PM    GFR est non-AA >60 01/20/2020 12:07 PM    Calcium 9.5 01/20/2020 12:07 PM    Bilirubin, total 0.6 01/20/2020 12:07 PM    AST (SGOT) 12 (L) 01/20/2020 12:07 PM    Alk. phosphatase 94 01/20/2020 12:07 PM    Protein, total 7.7 01/20/2020 12:07 PM    Albumin 3.7 01/20/2020 12:07 PM    Globulin 4.0 01/20/2020 12:07 PM    A-G Ratio 0.9 (L) 01/20/2020 12:07 PM    ALT (SGPT) 20 01/20/2020 12:07 PM             FOLLOW-UP     Follow-up and Dispositions    · Return in about 4 months (around 8/20/2020). Patient was made aware and verbalized understanding that an appointment will be scheduled for them for a virtual visit and/or office visit within the above time frame. Patient understanding his/her responsibility to call and change time/date if he/she so chooses. Thank you, Roseanne lOiver MD for allowing me to participate in the care of Eagle Sandhu. Please do not hesitate to contact me for further questions/concerns. Greater than 20 minutes was spent in direct video patient care, planning and chart review. This visit was conducted using [employer] telemedicine services.        Roya Hernandez MD    [facility] [address] [place] [address], [address] [place] [address]  [facility]        [phone] / [phone] Fax       [place] TERRENCE [facility] FRANKIE [facility]  Suite [facility], [address] [facility],[address]  Axel Jones  [phone] / [phone] Fax

## 2020-06-09 DIAGNOSIS — E11.9 TYPE 2 DIABETES MELLITUS WITHOUT COMPLICATION, WITH LONG-TERM CURRENT USE OF INSULIN (HCC): ICD-10-CM

## 2020-06-09 DIAGNOSIS — Z79.4 TYPE 2 DIABETES MELLITUS WITHOUT COMPLICATION, WITH LONG-TERM CURRENT USE OF INSULIN (HCC): ICD-10-CM

## 2020-06-09 RX ORDER — METFORMIN HYDROCHLORIDE 500 MG/1
TABLET ORAL
Qty: 180 TAB | Refills: 0 | Status: SHIPPED | OUTPATIENT
Start: 2020-06-09 | End: 2020-09-18

## 2020-07-16 NOTE — PROGRESS NOTES
[address], [id number] [facility]    Office [phone]  Fax [phone]      Hakeem Crockett is a 69 y.o. male last seen 3 months ago (VV)    Diagnoses  Encounter Diagnoses     ICD-10-CM ICD-9-CM   1. Chronic heart failure with preserved ejection fraction (HCC)  I50.32 428.9   2. Coronary artery disease involving native coronary artery of native heart without angina pectoris  I25.10 414.01   3. RBBB (right bundle branch block with left anterior fascicular block)  I45.2 426.52   4. Essential hypertension  I10 401.9   5. Type 2 diabetes mellitus without complication, with long-term current use of insulin (HCC)  E11.9 250.00    Z79.4 V58.67   6. JASWINDER treated with BiPAP  G47.33 327.23   7. Morbid (severe) obesity due to excess calories ([facility])  E66.01 278.01       Assessment/Recommendations:    Chronic HFpEF, class 2-3. Substrate=  HTN, JASWINDER, DM, morbid obesity. Updated echo today EF 55%. Volume status seems fine. BP normal. Functional capacity limitd by obeisty and arthritis. - Continue bumex 1 mg/d  - Continue weight loss strategy w/ Dr. Susan Oneil      Mild CAD by cath  - Continue aspirin  - Aggressive RF modification     HTN, controlled on combination Rx  - Continue home BP monitoring     JASWINDER on biPAP     T2DM. Recent A1c 7.8 January 2020. He has lost 20 lbs since that time. On MET + GLP1a. Managed by Dr Susan Oneil     Morbid obesity. Weight down to 284     Overall seems to be faring reasonably well during pandemic. Has good community support. Follow-up and Dispositions    · Return in about 6 months (around 1/17/2021). Subjective:  Hakeem Crockett reports some HUITRON with mild to moderate effort but functional capacity limited by due to arthritis. Patient denies any exertional chest pain, palpitations, syncope, orthopnea, edema or paroxysmal nocturnal dyspnea. He reports some recent wheezing and sinus drainage. He reports a mostly sedentary lifestyle.  Functional capacity limited by diffuse joint pain. He reports losing weight recently. He reports that he does not take his BP at home.     He ambulates w/ a cane normally, although he does not need it in his home. Today, he presents in clinic in a wheelchair.        Cardiac risk factors   HTN yes  DM  yes  Smoking no, former pipe smoker  Family hx of CAD no    Cardiac testing  Echo 3/7/19 - EF 45-50%, mild LVH    Lexiscan Cardiolite 9/6/19 - equivocal perfusion study. Fixed inferior defect likely due to attenuation artifact but infarction cannot be excluded. Cath 10/8/19 - mild CAD. Mildly elevated LVEDP; 14.    Patient Active Problem List    Diagnosis    Chronic heart failure with preserved ejection fraction (HCC)    CAD (coronary artery disease)    RBBB (right bundle branch block with left anterior fascicular block)    JASWINDER treated with BiPAP    Essential hypertension    Type 2 diabetes mellitus without complication, with long-term current use of insulin ([facility]    Bronchospastic airway disease    Morbid (severe) obesity due to excess calories ([facility]       Past Medical History:   Diagnosis Date    Depression     Diabetes ([facility]     Hypercholesterolemia     Hypertension     PTSD (post-traumatic stress disorder)     PTSD (post-traumatic stress disorder)         Social History     Socioeconomic History    Marital status:      Spouse name: Not on file    Number of children: Not on file    Years of education: Not on file    Highest education level: Not on file   Tobacco Use    Smoking status: Former Smoker    Smokeless tobacco: Never Used   Substance and Sexual Activity    Alcohol use: Yes     Comment: occasionally    Drug use: Never       No Known Allergies     Review of Symptoms:  Constitutional: Negative for fever, chills, malaise/fatigue and diaphoresis. Respiratory: Negative for hemoptysis, sputum production, and wheezing.  +HUITRON, cough  Cardiovascular: Negative for chest pain, palpitations, orthopnea, claudication, leg swelling and PND. Gastrointestinal: Negative for heartburn, nausea, vomiting, blood in stool and melena. Genitourinary: Negative for dysuria and flank pain. Musculoskeletal: Negative for joint pain and back pain. Skin: Negative for rash. Neurological: Negative for focal weakness, seizures, loss of consciousness, weakness and headaches. Endo/Heme/Allergies: Does not bruise/bleed easily. Psychiatric/Behavioral: Negative for memory loss. The patient does not have insomnia. Physical Exam  Visit Vitals  /84 (BP 1 Location: Left arm, BP Patient Position: Supine)   Pulse 88   Ht 5' 10\" (1.778 m)   Wt 279 lb (126.6 kg)   BMI 40.03 kg/m²     Wt Readings from Last 3 Encounters:   07/17/20 279 lb (126.6 kg)   07/17/20 279 lb (126.6 kg)   03/05/20 288 lb 8 oz (130.9 kg)     General - morbidly obese BM  HEENT: Eyes without jaundice, Hearing intact  Neck - JVP difficult to assess due to thick neck, thyroid nl  Cardiac - normal S1,S2, no murmurs, rubs or gallops. No clicks  Vascular - carotids without bruits, radials, femorals and pedal pulses equal bilateral  Lungs - clear to auscultation bilaterals, no rales ,wheezing or rhonchi  Abd - obese, soft nontender  Extremities - no edema  Neuro - nonfocal, normal gait  Psych - mood and affect normal    Cardiographics  EKG 5/13/19 - SR, RBBB, LAHB, no significant change from 7/16/10  EKG 7/17/20- SR, , RBBB, LAHV, unchanged from 5/14/19  Echo 7/17/20- EF 55-60%        Written by Dory Meza, as dictated by Arnol Sanchez M.D.      Arnol Sanchez MD

## 2020-07-17 ENCOUNTER — OFFICE VISIT (OUTPATIENT)
Dept: CARDIOLOGY CLINIC | Age: 70
End: 2020-07-17

## 2020-07-17 VITALS
HEIGHT: 70 IN | WEIGHT: 279 LBS | SYSTOLIC BLOOD PRESSURE: 140 MMHG | HEART RATE: 88 BPM | DIASTOLIC BLOOD PRESSURE: 84 MMHG | BODY MASS INDEX: 39.94 KG/M2

## 2020-07-17 DIAGNOSIS — G47.33 OSA TREATED WITH BIPAP: ICD-10-CM

## 2020-07-17 DIAGNOSIS — E11.9 TYPE 2 DIABETES MELLITUS WITHOUT COMPLICATION, WITH LONG-TERM CURRENT USE OF INSULIN (HCC): ICD-10-CM

## 2020-07-17 DIAGNOSIS — I25.10 CORONARY ARTERY DISEASE INVOLVING NATIVE CORONARY ARTERY OF NATIVE HEART WITHOUT ANGINA PECTORIS: ICD-10-CM

## 2020-07-17 DIAGNOSIS — E66.01 MORBID (SEVERE) OBESITY DUE TO EXCESS CALORIES (HCC): ICD-10-CM

## 2020-07-17 DIAGNOSIS — I10 ESSENTIAL HYPERTENSION: ICD-10-CM

## 2020-07-17 DIAGNOSIS — Z79.4 TYPE 2 DIABETES MELLITUS WITHOUT COMPLICATION, WITH LONG-TERM CURRENT USE OF INSULIN (HCC): ICD-10-CM

## 2020-07-17 DIAGNOSIS — I45.2 RBBB (RIGHT BUNDLE BRANCH BLOCK WITH LEFT ANTERIOR FASCICULAR BLOCK): ICD-10-CM

## 2020-07-17 DIAGNOSIS — I50.32 CHRONIC HEART FAILURE WITH PRESERVED EJECTION FRACTION (HCC): Primary | ICD-10-CM

## 2020-07-17 NOTE — PROGRESS NOTES
Ary Rojo is a 69 y.o. male    Visit Vitals  /84 (BP 1 Location: Left arm, BP Patient Position: Supine)   Pulse 88   Ht 5' 10\" (1.778 m)   Wt 279 lb (126.6 kg)   BMI 40.03 kg/m²       Chief Complaint   Patient presents with    Coronary Artery Disease    Hypertension    CHF       Chest pain NO  SOB NO  Dizziness NO  Swelling NO  Recent hospital visit NO  Refills NO

## 2020-07-17 NOTE — LETTER
7/18/20 Patient: Lion Burgess YOB: 1950 Date of Visit: 7/17/2020 Aria Toth MD 
[address] [address] VIA In Basket Dear Aria Toth MD, Thank you for referring Mr. Nicole Turcios to [address] for evaluation. My notes for this consultation are attached. If you have questions, please do not hesitate to call me. I look forward to following your patient along with you. Sincerely, Ryan Mitchell MD

## 2020-07-18 PROBLEM — I50.32 CHRONIC HEART FAILURE WITH PRESERVED EJECTION FRACTION (HCC): Status: ACTIVE | Noted: 2020-07-18

## 2020-09-17 RX ORDER — BUMETANIDE 1 MG/1
1 TABLET ORAL DAILY
Qty: 90 TAB | Refills: 1 | Status: SHIPPED | OUTPATIENT
Start: 2020-09-17

## 2020-09-17 NOTE — TELEPHONE ENCOUNTER
Requested Prescriptions     Pending Prescriptions Disp Refills    bumetanide (BUMEX) 1 mg tablet 90 Tab 1     Sig: Take 1 Tab by mouth daily.      BOWEN Wong

## 2020-09-25 DIAGNOSIS — Z79.4 TYPE 2 DIABETES MELLITUS WITHOUT COMPLICATION, WITH LONG-TERM CURRENT USE OF INSULIN (HCC): ICD-10-CM

## 2020-09-25 DIAGNOSIS — E11.9 TYPE 2 DIABETES MELLITUS WITHOUT COMPLICATION, WITH LONG-TERM CURRENT USE OF INSULIN (HCC): ICD-10-CM

## 2021-03-14 DIAGNOSIS — I10 ESSENTIAL HYPERTENSION: ICD-10-CM

## 2021-03-14 DIAGNOSIS — E11.9 TYPE 2 DIABETES MELLITUS WITHOUT COMPLICATION, WITH LONG-TERM CURRENT USE OF INSULIN (HCC): Primary | ICD-10-CM

## 2021-03-14 DIAGNOSIS — Z79.4 TYPE 2 DIABETES MELLITUS WITHOUT COMPLICATION, WITH LONG-TERM CURRENT USE OF INSULIN (HCC): Primary | ICD-10-CM

## 2021-03-16 ENCOUNTER — TELEPHONE (OUTPATIENT)
Dept: FAMILY MEDICINE CLINIC | Age: 71
End: 2021-03-16

## 2022-03-18 PROBLEM — I45.2 RBBB (RIGHT BUNDLE BRANCH BLOCK WITH LEFT ANTERIOR FASCICULAR BLOCK): Status: ACTIVE | Noted: 2019-07-23

## 2022-03-19 PROBLEM — I10 ESSENTIAL HYPERTENSION: Status: ACTIVE | Noted: 2019-07-23

## 2022-03-19 PROBLEM — E66.01 MORBID (SEVERE) OBESITY DUE TO EXCESS CALORIES (HCC): Status: ACTIVE | Noted: 2019-07-22

## 2022-03-19 PROBLEM — J98.09 BRONCHOSPASTIC AIRWAY DISEASE: Status: ACTIVE | Noted: 2019-07-23

## 2022-03-19 PROBLEM — I50.32 CHRONIC HEART FAILURE WITH PRESERVED EJECTION FRACTION (HCC): Status: ACTIVE | Noted: 2020-07-18

## 2022-03-20 PROBLEM — Z79.4 TYPE 2 DIABETES MELLITUS WITHOUT COMPLICATION, WITH LONG-TERM CURRENT USE OF INSULIN (HCC): Status: ACTIVE | Noted: 2019-07-23

## 2022-03-20 PROBLEM — E11.9 TYPE 2 DIABETES MELLITUS WITHOUT COMPLICATION, WITH LONG-TERM CURRENT USE OF INSULIN (HCC): Status: ACTIVE | Noted: 2019-07-23

## 2022-03-20 PROBLEM — G47.33 OSA TREATED WITH BIPAP: Status: ACTIVE | Noted: 2019-07-23

## 2022-03-20 PROBLEM — I25.10 CAD (CORONARY ARTERY DISEASE): Status: ACTIVE | Noted: 2020-01-24

## 2023-07-13 NOTE — H&P
Cardiology History and Physical    Admission date & time: 10/8/2019 7:57 AM     Chief complaint:  Ivy Haney is a [address] y.o. male who is here for cath    Impression:  Chronic HUITRON/mild LV dysfunction/abnormal stress MPI with fixed inferior defect, EF 47%  HTN  Morbid obesity  JASWINDER on CPAP      Plan:   LHC via right radial approach  ASA 2  Airway 3    History of present illness:  Chronic HTN, T2DM, morbid obesity, DM with chronic HUITRON. Recent echo with mild LV dysfunction. Stress nuclear abnormal as noted below. No chest pain. Adherent with CPAP    Cardiac testing  Echo 3/7/19 - EF 45-50%, mild LVH    Lexiscan Cardiolite 9/6/19 - equivocal perfusion study. Fixed inferior defect likely due to attenuation artifact but infarction cannot be excluded. Past Medical History:   Diagnosis Date    Diabetes ([address]     Hypercholesterolemia     Hypertension     PTSD (post-traumatic stress disorder)       Past Surgical History:   Procedure Laterality Date    HX ORTHOPAEDIC      L/R arthroscopic     No family history on file. Primary care physician:  Tomy Truong MD     Medications before admission:  No current facility-administered medications on file prior to encounter. Current Outpatient Medications on File Prior to Encounter   Medication Sig Dispense Refill    oxybutynin chloride XL (DITROPAN XL) 10 mg CR tablet TAKE 1 TABLET BY MOUTH EVERY MORNING  99    tamsulosin (FLOMAX) 0.4 mg capsule TAKE ONE CAPSULE BY MOUTH EVERY MORNING  99    ANORO ELLIPTA 62.5-25 mcg/actuation inhaler TAKE 1 PUFF BY MOUTH EVERY DAY  6    dilTIAZem (TIAZAC) 360 mg ER capsule Take 360 mg by mouth daily.  insulin NPH/insulin regular (NOVOLIN 70/30 U-100 INSULIN) 100 unit/mL (70-30) injection by SubCUTAneous route.  albuterol (PROVENTIL VENTOLIN) 2.5 mg /3 mL (0.083 %) nebu by Nebulization route.       albuterol (PROVENTIL HFA, VENTOLIN HFA, PROAIR HFA) 90 mcg/actuation inhaler Take  by inhalation.  traMADol (ULTRAM) 50 mg tablet Take 50 mg by mouth every six (6) hours as needed for Pain.  CALCIUM PO Take 500 mg by mouth daily.  guaiFENesin (ROBITUSSIN) 100 mg/5 mL liquid Take 200 mg by mouth three (3) times daily as needed for Cough.  HYDROPHILIC CREAM EX by Apply Externally route.  magnesium oxide 420 mg tab Take  by mouth.  losartan (COZAAR) 100 mg tablet Take 100 mg by mouth daily.  cholecalciferol (VITAMIN D3) 1,000 unit cap Take  by mouth daily.  multivitamin (ONE A DAY) tablet Take 1 Tab by mouth daily.  rosuvastatin (CRESTOR) 40 mg tablet Take 40 mg by mouth nightly.  sertraline (ZOLOFT) 100 mg tablet Take  by mouth daily.  gabapentin (NEURONTIN) 100 mg capsule Take  by mouth three (3) times daily.  acetaminophen (TYLENOL) 500 mg tablet Take  by mouth every six (6) hours as needed for Pain.  glucose 4 gram chewable tablet Take 15 g by mouth as needed.  aspirin delayed-release 81 mg tablet Take  by mouth daily.  latanoprost (XALATAN) 0.005 % ophthalmic solution Administer 1 Drop to both eyes nightly.  loratadine (CLARITIN) 10 mg tablet Take 10 mg by mouth.  mometasone (ASMANEX TWISTHALER) 220 mcg (120 doses) aepb inhaler Take  by inhalation. No Known Allergies           Review of systems:    All other systems reviewed and are negative except as above.     Physical Exam:  Visit Vitals  /90 (BP 1 Location: Right arm, BP Patient Position: At rest)   Pulse 77   Temp 97.7 °F (36.5 °C)   Resp 19   Ht 5' 10\" (1.778 m)   Wt 329 lb 9.4 oz (149.5 kg)   SpO2 95%   BMI 47.29 kg/m²        Physical Examination: General appearance - alert, well appearing, and in no distress and overweight  Neck - supple, no significant adenopathy, carotids upstroke normal bilaterally, no bruits  Chest - clear to auscultation, no wheezes, rales or rhonchi, symmetric air entry  Heart - normal rate, regular rhythm, normal S1, S2, no murmurs, rubs, clicks or gallops  Abdomen - soft, nontender, nondistended, no masses or organomegaly  Extremities - LE/pedal edema 2-3+  Vascular - radial pulses 2+, L=R    Laboratory and Imaging have been reviewed and are notable for:        Recent Results (from the past 12 hour(s))   GLUCOSE, POC    Collection Time: 10/08/19  7:33 AM   Result Value Ref Range    Glucose (POC) 75 65 - 100 mg/dL    Performed by Ravi Corbett none

## (undated) DEVICE — PACK PROCEDURE SURG HRT CATH

## (undated) DEVICE — TR BAND RADIAL ARTERY COMPRESSION DEVICE: Brand: TR BAND

## (undated) DEVICE — CATH DIAG D 5F 155 MULTIPK X3 -- IMPULSE 16391-302

## (undated) DEVICE — GLIDESHEATH SLENDER ACCESS KIT: Brand: GLIDESHEATH SLENDER

## (undated) DEVICE — PROCEDURE KIT FLUID MGMT CUST MAINFOLD STRL

## (undated) DEVICE — SYR MED COLOR CODED 3ML RED -- MEDALLION

## (undated) DEVICE — SYRINGE ANGIO 10ML RED FLAT GRP FIX M LUER CONN MEDALLION

## (undated) DEVICE — SUPPORT WRST AD W3.5XL9IN DIA14.5IN ART SFT ADJ HK AND LOOP

## (undated) DEVICE — TUBING PRSS MON L60IN PVC RIG NONEXPANDING M TO FEM CONN

## (undated) DEVICE — Device

## (undated) DEVICE — ROSEN CURVED WIRE GUIDE: Brand: ROSEN

## (undated) DEVICE — CATHETER IV STR 22 GAX1 IN RADIOPAQUE SURFLO